# Patient Record
Sex: FEMALE | Race: WHITE | NOT HISPANIC OR LATINO | Employment: FULL TIME | URBAN - METROPOLITAN AREA
[De-identification: names, ages, dates, MRNs, and addresses within clinical notes are randomized per-mention and may not be internally consistent; named-entity substitution may affect disease eponyms.]

---

## 2024-05-22 ENCOUNTER — LAB (OUTPATIENT)
Dept: LAB | Facility: HOSPITAL | Age: 59
End: 2024-05-22
Payer: COMMERCIAL

## 2024-05-22 ENCOUNTER — TRANSCRIBE ORDERS (OUTPATIENT)
Dept: LAB | Facility: HOSPITAL | Age: 59
End: 2024-05-22
Payer: COMMERCIAL

## 2024-05-22 ENCOUNTER — TRANSCRIBE ORDERS (OUTPATIENT)
Dept: CARDIOLOGY | Facility: HOSPITAL | Age: 59
End: 2024-05-22
Payer: COMMERCIAL

## 2024-05-22 ENCOUNTER — HOSPITAL ENCOUNTER (OUTPATIENT)
Dept: CARDIOLOGY | Facility: HOSPITAL | Age: 59
Discharge: HOME | End: 2024-05-22
Payer: COMMERCIAL

## 2024-05-22 ENCOUNTER — HOSPITAL ENCOUNTER (OUTPATIENT)
Dept: GENERAL RADIOLOGY | Facility: HOSPITAL | Age: 59
Discharge: HOME | End: 2024-05-22
Payer: COMMERCIAL

## 2024-05-22 ENCOUNTER — TRANSCRIBE ORDERS (OUTPATIENT)
Dept: LAB | Facility: HOSPITAL | Age: 59
End: 2024-05-22

## 2024-05-22 DIAGNOSIS — Z01.818 PRE-OP TESTING: ICD-10-CM

## 2024-05-22 DIAGNOSIS — M17.12 ARTHRITIS OF LEFT KNEE: ICD-10-CM

## 2024-05-22 DIAGNOSIS — M17.12 ARTHRITIS OF LEFT KNEE: Primary | ICD-10-CM

## 2024-05-22 DIAGNOSIS — Z01.811 PRE-OP CHEST EXAM: Primary | ICD-10-CM

## 2024-05-22 LAB
ALBUMIN SERPL-MCNC: 4.4 G/DL (ref 3.5–5.2)
ALBUMIN/GLOB SERPL: 1.8 G/DL
ALP SERPL-CCNC: 66 U/L (ref 39–117)
ALT SERPL W P-5'-P-CCNC: 15 U/L (ref 1–33)
ANION GAP SERPL CALCULATED.3IONS-SCNC: 11.2 MMOL/L (ref 5–15)
AST SERPL-CCNC: 18 U/L (ref 1–32)
BILIRUB SERPL-MCNC: 0.9 MG/DL (ref 0–1.2)
BILIRUB UR QL STRIP: NEGATIVE
BUN SERPL-MCNC: 12 MG/DL (ref 6–20)
BUN/CREAT SERPL: 13 (ref 7–25)
CALCIUM SPEC-SCNC: 9.3 MG/DL (ref 8.6–10.5)
CHLORIDE SERPL-SCNC: 105 MMOL/L (ref 98–107)
CLARITY UR: CLEAR
CO2 SERPL-SCNC: 26.8 MMOL/L (ref 22–29)
COLOR UR: YELLOW
CREAT SERPL-MCNC: 0.92 MG/DL (ref 0.57–1)
DEPRECATED RDW RBC AUTO: 43.7 FL (ref 37–54)
EGFRCR SERPLBLD CKD-EPI 2021: 73.2 ML/MIN/1.73
ERYTHROCYTE [DISTWIDTH] IN BLOOD BY AUTOMATED COUNT: 13.1 % (ref 12.3–15.4)
GLOBULIN UR ELPH-MCNC: 2.5 GM/DL
GLUCOSE SERPL-MCNC: 69 MG/DL (ref 65–99)
GLUCOSE UR STRIP-MCNC: NEGATIVE MG/DL
HCT VFR BLD AUTO: 38.7 % (ref 34–46.6)
HGB BLD-MCNC: 12.6 G/DL (ref 12–15.9)
HGB UR QL STRIP.AUTO: NEGATIVE
KETONES UR QL STRIP: NEGATIVE
LEUKOCYTE ESTERASE UR QL STRIP.AUTO: NEGATIVE
MCH RBC QN AUTO: 29.9 PG (ref 26.6–33)
MCHC RBC AUTO-ENTMCNC: 32.6 G/DL (ref 31.5–35.7)
MCV RBC AUTO: 91.7 FL (ref 79–97)
NITRITE UR QL STRIP: NEGATIVE
PH UR STRIP.AUTO: 6.5 [PH] (ref 5–8)
PLATELET # BLD AUTO: 236 10*3/MM3 (ref 140–450)
PMV BLD AUTO: 9.5 FL (ref 6–12)
POTASSIUM SERPL-SCNC: 4 MMOL/L (ref 3.5–5.2)
PROT SERPL-MCNC: 6.9 G/DL (ref 6–8.5)
PROT UR QL STRIP: NEGATIVE
QT INTERVAL: 428 MS
QTC INTERVAL: 410 MS
RBC # BLD AUTO: 4.22 10*6/MM3 (ref 3.77–5.28)
SODIUM SERPL-SCNC: 143 MMOL/L (ref 136–145)
SP GR UR STRIP: 1.01 (ref 1–1.03)
UROBILINOGEN UR QL STRIP: NORMAL
WBC NRBC COR # BLD AUTO: 5 10*3/MM3 (ref 3.4–10.8)

## 2024-05-22 PROCEDURE — 85027 COMPLETE CBC AUTOMATED: CPT

## 2024-05-22 PROCEDURE — 71046 X-RAY EXAM CHEST 2 VIEWS: CPT

## 2024-05-22 PROCEDURE — 80053 COMPREHEN METABOLIC PANEL: CPT

## 2024-05-22 PROCEDURE — 36415 COLL VENOUS BLD VENIPUNCTURE: CPT

## 2024-05-22 PROCEDURE — 81003 URINALYSIS AUTO W/O SCOPE: CPT

## 2024-05-22 PROCEDURE — 93005 ELECTROCARDIOGRAM TRACING: CPT

## 2024-08-23 ENCOUNTER — TRANSCRIBE ORDERS (OUTPATIENT)
Dept: ADMINISTRATIVE | Facility: HOSPITAL | Age: 59
End: 2024-08-23

## 2024-08-23 ENCOUNTER — TRANSCRIBE ORDERS (OUTPATIENT)
Dept: ADMINISTRATIVE | Facility: HOSPITAL | Age: 59
End: 2024-08-23
Payer: COMMERCIAL

## 2024-08-23 ENCOUNTER — LAB (OUTPATIENT)
Dept: LAB | Facility: HOSPITAL | Age: 59
End: 2024-08-23
Payer: COMMERCIAL

## 2024-08-23 DIAGNOSIS — M25.562 LEFT KNEE PAIN, UNSPECIFIED CHRONICITY: Primary | ICD-10-CM

## 2024-08-23 DIAGNOSIS — M25.562 LEFT KNEE PAIN, UNSPECIFIED CHRONICITY: ICD-10-CM

## 2024-08-23 LAB
APPEARANCE FLD: ABNORMAL
COLOR FLD: ABNORMAL
EOSINOPHIL NFR FLD MANUAL: 2 %
LYMPHOCYTES NFR FLD MANUAL: 44 %
METHOD: ABNORMAL
MONOCYTES NFR FLD: 1 %
MONOS+MACROS NFR FLD: 3 %
NEUTROPHILS NFR FLD MANUAL: 50 %
NUC CELL # FLD: 621 /MM3
RBC # FLD AUTO: ABNORMAL /MM3

## 2024-08-23 PROCEDURE — 87205 SMEAR GRAM STAIN: CPT

## 2024-08-23 PROCEDURE — 87015 SPECIMEN INFECT AGNT CONCNTJ: CPT

## 2024-08-23 PROCEDURE — 87070 CULTURE OTHR SPECIMN AEROBIC: CPT

## 2024-08-23 PROCEDURE — 89051 BODY FLUID CELL COUNT: CPT

## 2024-08-28 LAB
BACTERIA FLD CULT: NORMAL
GRAM STN SPEC: NORMAL
GRAM STN SPEC: NORMAL

## 2025-02-04 ENCOUNTER — LAB (OUTPATIENT)
Dept: LAB | Facility: HOSPITAL | Age: 60
End: 2025-02-04
Payer: COMMERCIAL

## 2025-02-04 ENCOUNTER — TRANSCRIBE ORDERS (OUTPATIENT)
Dept: LAB | Facility: HOSPITAL | Age: 60
End: 2025-02-04
Payer: COMMERCIAL

## 2025-02-04 DIAGNOSIS — M25.562 LEFT KNEE PAIN, UNSPECIFIED CHRONICITY: Primary | ICD-10-CM

## 2025-02-04 DIAGNOSIS — M25.562 LEFT KNEE PAIN, UNSPECIFIED CHRONICITY: ICD-10-CM

## 2025-02-04 LAB
ALBUMIN SERPL-MCNC: 4.3 G/DL (ref 3.5–5.2)
ALBUMIN/GLOB SERPL: 1.5 G/DL
ALP SERPL-CCNC: 74 U/L (ref 39–117)
ALT SERPL W P-5'-P-CCNC: 16 U/L (ref 1–33)
ANION GAP SERPL CALCULATED.3IONS-SCNC: 12 MMOL/L (ref 5–15)
APPEARANCE FLD: ABNORMAL
AST SERPL-CCNC: 17 U/L (ref 1–32)
BASOPHILS # BLD AUTO: 0.03 10*3/MM3 (ref 0–0.2)
BASOPHILS NFR BLD AUTO: 0.3 % (ref 0–1.5)
BILIRUB SERPL-MCNC: 1.7 MG/DL (ref 0–1.2)
BUN SERPL-MCNC: 15 MG/DL (ref 6–20)
BUN/CREAT SERPL: 16.7 (ref 7–25)
CALCIUM SPEC-SCNC: 9.3 MG/DL (ref 8.6–10.5)
CHLORIDE SERPL-SCNC: 103 MMOL/L (ref 98–107)
CO2 SERPL-SCNC: 24 MMOL/L (ref 22–29)
COLOR FLD: ABNORMAL
CREAT SERPL-MCNC: 0.9 MG/DL (ref 0.57–1)
CRP SERPL-MCNC: 17.46 MG/DL (ref 0–0.5)
DEPRECATED RDW RBC AUTO: 38.9 FL (ref 37–54)
EGFRCR SERPLBLD CKD-EPI 2021: 74.7 ML/MIN/1.73
EOSINOPHIL # BLD AUTO: 0.1 10*3/MM3 (ref 0–0.4)
EOSINOPHIL NFR BLD AUTO: 1 % (ref 0.3–6.2)
ERYTHROCYTE [DISTWIDTH] IN BLOOD BY AUTOMATED COUNT: 12.6 % (ref 12.3–15.4)
ERYTHROCYTE [SEDIMENTATION RATE] IN BLOOD: 17 MM/HR (ref 0–30)
GLOBULIN UR ELPH-MCNC: 2.9 GM/DL
GLUCOSE SERPL-MCNC: 85 MG/DL (ref 65–99)
HCT VFR BLD AUTO: 36.6 % (ref 34–46.6)
HGB BLD-MCNC: 12 G/DL (ref 12–15.9)
HOLD SPECIMEN: NORMAL
IMM GRANULOCYTES # BLD AUTO: 0.05 10*3/MM3 (ref 0–0.05)
IMM GRANULOCYTES NFR BLD AUTO: 0.5 % (ref 0–0.5)
LYMPHOCYTES # BLD AUTO: 0.74 10*3/MM3 (ref 0.7–3.1)
LYMPHOCYTES NFR BLD AUTO: 7.2 % (ref 19.6–45.3)
LYMPHOCYTES NFR FLD MANUAL: 1 %
MCH RBC QN AUTO: 28.4 PG (ref 26.6–33)
MCHC RBC AUTO-ENTMCNC: 32.8 G/DL (ref 31.5–35.7)
MCV RBC AUTO: 86.5 FL (ref 79–97)
METHOD: ABNORMAL
MONOCYTES # BLD AUTO: 1.14 10*3/MM3 (ref 0.1–0.9)
MONOCYTES NFR BLD AUTO: 11.1 % (ref 5–12)
MONOCYTES NFR FLD: 7 %
MONOS+MACROS NFR FLD: 40 %
NEUTROPHILS NFR BLD AUTO: 79.9 % (ref 42.7–76)
NEUTROPHILS NFR BLD AUTO: 8.24 10*3/MM3 (ref 1.7–7)
NEUTROPHILS NFR FLD MANUAL: 52 %
NRBC BLD AUTO-RTO: 0 /100 WBC (ref 0–0.2)
NUC CELL # FLD: ABNORMAL /MM3
PLATELET # BLD AUTO: 189 10*3/MM3 (ref 140–450)
PMV BLD AUTO: 9.5 FL (ref 6–12)
POTASSIUM SERPL-SCNC: 5 MMOL/L (ref 3.5–5.2)
PROT SERPL-MCNC: 7.2 G/DL (ref 6–8.5)
RBC # BLD AUTO: 4.23 10*6/MM3 (ref 3.77–5.28)
RBC # FLD AUTO: ABNORMAL /MM3
SODIUM SERPL-SCNC: 139 MMOL/L (ref 136–145)
WBC NRBC COR # BLD AUTO: 10.3 10*3/MM3 (ref 3.4–10.8)

## 2025-02-04 PROCEDURE — 85025 COMPLETE CBC W/AUTO DIFF WBC: CPT

## 2025-02-04 PROCEDURE — 87015 SPECIMEN INFECT AGNT CONCNTJ: CPT | Performed by: ORTHOPAEDIC SURGERY

## 2025-02-04 PROCEDURE — 87205 SMEAR GRAM STAIN: CPT | Performed by: ORTHOPAEDIC SURGERY

## 2025-02-04 PROCEDURE — 36415 COLL VENOUS BLD VENIPUNCTURE: CPT

## 2025-02-04 PROCEDURE — 86140 C-REACTIVE PROTEIN: CPT

## 2025-02-04 PROCEDURE — 87070 CULTURE OTHR SPECIMN AEROBIC: CPT | Performed by: ORTHOPAEDIC SURGERY

## 2025-02-04 PROCEDURE — 80053 COMPREHEN METABOLIC PANEL: CPT

## 2025-02-04 PROCEDURE — 85652 RBC SED RATE AUTOMATED: CPT

## 2025-02-04 PROCEDURE — 89051 BODY FLUID CELL COUNT: CPT | Performed by: ORTHOPAEDIC SURGERY

## 2025-02-06 ENCOUNTER — HOSPITAL ENCOUNTER (OUTPATIENT)
Dept: GENERAL RADIOLOGY | Facility: HOSPITAL | Age: 60
Discharge: HOME | End: 2025-02-06
Payer: COMMERCIAL

## 2025-02-06 ENCOUNTER — HOSPITAL ENCOUNTER (INPATIENT)
Facility: HOSPITAL | Age: 60
LOS: 1 days | Discharge: HOME | DRG: 487 | End: 2025-02-11
Attending: ORTHOPAEDIC SURGERY | Admitting: ORTHOPAEDIC SURGERY
Payer: COMMERCIAL

## 2025-02-06 ENCOUNTER — PRE-ADMISSION TESTING (OUTPATIENT)
Dept: PREADMISSION TESTING | Facility: HOSPITAL | Age: 60
End: 2025-02-06
Payer: COMMERCIAL

## 2025-02-06 VITALS
SYSTOLIC BLOOD PRESSURE: 110 MMHG | TEMPERATURE: 98 F | OXYGEN SATURATION: 98 % | BODY MASS INDEX: 23.68 KG/M2 | WEIGHT: 138.7 LBS | HEART RATE: 80 BPM | RESPIRATION RATE: 18 BRPM | DIASTOLIC BLOOD PRESSURE: 71 MMHG | HEIGHT: 64 IN

## 2025-02-06 DIAGNOSIS — Z96.652 HISTORY OF KNEE REPLACEMENT PROCEDURE OF LEFT KNEE: ICD-10-CM

## 2025-02-06 PROBLEM — T84.59XA INFECTED PROSTHETIC KNEE JOINT: Status: ACTIVE | Noted: 2025-02-06

## 2025-02-06 PROBLEM — Z96.659 INFECTED PROSTHETIC KNEE JOINT: Status: ACTIVE | Noted: 2025-02-06

## 2025-02-06 LAB
ABO GROUP BLD: NORMAL
ANION GAP SERPL CALCULATED.3IONS-SCNC: 9 MMOL/L (ref 5–15)
BASOPHILS # BLD AUTO: 0.02 10*3/MM3 (ref 0–0.2)
BASOPHILS NFR BLD AUTO: 0.4 % (ref 0–1.5)
BLD GP AB SCN SERPL QL: NEGATIVE
BUN SERPL-MCNC: 10 MG/DL (ref 6–20)
BUN/CREAT SERPL: 14.3 (ref 7–25)
CALCIUM SPEC-SCNC: 9.1 MG/DL (ref 8.6–10.5)
CHLORIDE SERPL-SCNC: 104 MMOL/L (ref 98–107)
CO2 SERPL-SCNC: 27 MMOL/L (ref 22–29)
CREAT SERPL-MCNC: 0.7 MG/DL (ref 0.57–1)
DEPRECATED RDW RBC AUTO: 39 FL (ref 37–54)
EGFRCR SERPLBLD CKD-EPI 2021: 101 ML/MIN/1.73
EOSINOPHIL # BLD AUTO: 0.13 10*3/MM3 (ref 0–0.4)
EOSINOPHIL NFR BLD AUTO: 2.8 % (ref 0.3–6.2)
ERYTHROCYTE [DISTWIDTH] IN BLOOD BY AUTOMATED COUNT: 12.5 % (ref 12.3–15.4)
GLUCOSE SERPL-MCNC: 121 MG/DL (ref 65–99)
HBA1C MFR BLD: 4.8 % (ref 4.8–5.6)
HCT VFR BLD AUTO: 33.6 % (ref 34–46.6)
HGB BLD-MCNC: 11.5 G/DL (ref 12–15.9)
IMM GRANULOCYTES # BLD AUTO: 0.01 10*3/MM3 (ref 0–0.05)
IMM GRANULOCYTES NFR BLD AUTO: 0.2 % (ref 0–0.5)
INR PPP: 1.03 (ref 0.9–1.1)
LYMPHOCYTES # BLD AUTO: 0.86 10*3/MM3 (ref 0.7–3.1)
LYMPHOCYTES NFR BLD AUTO: 18.4 % (ref 19.6–45.3)
MCH RBC QN AUTO: 29.6 PG (ref 26.6–33)
MCHC RBC AUTO-ENTMCNC: 34.2 G/DL (ref 31.5–35.7)
MCV RBC AUTO: 86.6 FL (ref 79–97)
MONOCYTES # BLD AUTO: 0.55 10*3/MM3 (ref 0.1–0.9)
MONOCYTES NFR BLD AUTO: 11.8 % (ref 5–12)
NEUTROPHILS NFR BLD AUTO: 3.1 10*3/MM3 (ref 1.7–7)
NEUTROPHILS NFR BLD AUTO: 66.4 % (ref 42.7–76)
NRBC BLD AUTO-RTO: 0 /100 WBC (ref 0–0.2)
PLATELET # BLD AUTO: 238 10*3/MM3 (ref 140–450)
PMV BLD AUTO: 9.3 FL (ref 6–12)
POTASSIUM SERPL-SCNC: 3.6 MMOL/L (ref 3.5–5.2)
PROTHROMBIN TIME: 13.4 SECONDS (ref 11.7–14.2)
RBC # BLD AUTO: 3.88 10*6/MM3 (ref 3.77–5.28)
RH BLD: POSITIVE
SODIUM SERPL-SCNC: 140 MMOL/L (ref 136–145)
T&S EXPIRATION DATE: NORMAL
WBC NRBC COR # BLD AUTO: 4.67 10*3/MM3 (ref 3.4–10.8)

## 2025-02-06 PROCEDURE — 85025 COMPLETE CBC W/AUTO DIFF WBC: CPT | Performed by: ORTHOPAEDIC SURGERY

## 2025-02-06 PROCEDURE — 86850 RBC ANTIBODY SCREEN: CPT | Performed by: ORTHOPAEDIC SURGERY

## 2025-02-06 PROCEDURE — 86900 BLOOD TYPING SEROLOGIC ABO: CPT | Performed by: ORTHOPAEDIC SURGERY

## 2025-02-06 PROCEDURE — 36415 COLL VENOUS BLD VENIPUNCTURE: CPT

## 2025-02-06 PROCEDURE — 86901 BLOOD TYPING SEROLOGIC RH(D): CPT | Performed by: ORTHOPAEDIC SURGERY

## 2025-02-06 PROCEDURE — 73560 X-RAY EXAM OF KNEE 1 OR 2: CPT

## 2025-02-06 PROCEDURE — 80048 BASIC METABOLIC PNL TOTAL CA: CPT | Performed by: ORTHOPAEDIC SURGERY

## 2025-02-06 PROCEDURE — 93010 ELECTROCARDIOGRAM REPORT: CPT | Performed by: INTERNAL MEDICINE

## 2025-02-06 PROCEDURE — 71046 X-RAY EXAM CHEST 2 VIEWS: CPT

## 2025-02-06 PROCEDURE — 83036 HEMOGLOBIN GLYCOSYLATED A1C: CPT

## 2025-02-06 PROCEDURE — G0378 HOSPITAL OBSERVATION PER HR: HCPCS

## 2025-02-06 PROCEDURE — 85610 PROTHROMBIN TIME: CPT

## 2025-02-06 PROCEDURE — 93005 ELECTROCARDIOGRAM TRACING: CPT

## 2025-02-06 RX ORDER — BISACODYL 10 MG
10 SUPPOSITORY, RECTAL RECTAL DAILY PRN
Status: DISCONTINUED | OUTPATIENT
Start: 2025-02-06 | End: 2025-02-11 | Stop reason: HOSPADM

## 2025-02-06 RX ORDER — AMOXICILLIN 250 MG
2 CAPSULE ORAL 2 TIMES DAILY
Status: DISCONTINUED | OUTPATIENT
Start: 2025-02-06 | End: 2025-02-11 | Stop reason: HOSPADM

## 2025-02-06 RX ORDER — OXYCODONE HYDROCHLORIDE 5 MG/1
5 TABLET ORAL EVERY 4 HOURS PRN
Status: DISCONTINUED | OUTPATIENT
Start: 2025-02-06 | End: 2025-02-11 | Stop reason: HOSPADM

## 2025-02-06 RX ORDER — CHLORHEXIDINE GLUCONATE 40 MG/ML
SOLUTION TOPICAL
COMMUNITY
Start: 2024-05-16 | End: 2025-02-06

## 2025-02-06 RX ORDER — SODIUM CHLORIDE 9 MG/ML
40 INJECTION, SOLUTION INTRAVENOUS AS NEEDED
Status: DISCONTINUED | OUTPATIENT
Start: 2025-02-06 | End: 2025-02-11 | Stop reason: HOSPADM

## 2025-02-06 RX ORDER — POLYETHYLENE GLYCOL 3350 17 G/17G
17 POWDER, FOR SOLUTION ORAL DAILY PRN
Status: DISCONTINUED | OUTPATIENT
Start: 2025-02-06 | End: 2025-02-11 | Stop reason: HOSPADM

## 2025-02-06 RX ORDER — NALOXONE HCL 0.4 MG/ML
0.4 VIAL (ML) INJECTION
Status: DISCONTINUED | OUTPATIENT
Start: 2025-02-06 | End: 2025-02-11 | Stop reason: HOSPADM

## 2025-02-06 RX ORDER — SODIUM CHLORIDE 0.9 % (FLUSH) 0.9 %
1-10 SYRINGE (ML) INJECTION AS NEEDED
Status: DISCONTINUED | OUTPATIENT
Start: 2025-02-06 | End: 2025-02-11 | Stop reason: HOSPADM

## 2025-02-06 RX ORDER — BISACODYL 5 MG/1
5 TABLET, DELAYED RELEASE ORAL DAILY PRN
Status: DISCONTINUED | OUTPATIENT
Start: 2025-02-06 | End: 2025-02-11 | Stop reason: HOSPADM

## 2025-02-06 RX ORDER — SODIUM CHLORIDE 0.9 % (FLUSH) 0.9 %
10 SYRINGE (ML) INJECTION EVERY 12 HOURS SCHEDULED
Status: DISCONTINUED | OUTPATIENT
Start: 2025-02-06 | End: 2025-02-11 | Stop reason: HOSPADM

## 2025-02-06 RX ORDER — ACETAMINOPHEN 500 MG
500 TABLET ORAL EVERY 6 HOURS PRN
COMMUNITY

## 2025-02-06 RX ORDER — FAMOTIDINE 20 MG/1
40 TABLET, FILM COATED ORAL DAILY
Status: DISCONTINUED | OUTPATIENT
Start: 2025-02-06 | End: 2025-02-11 | Stop reason: HOSPADM

## 2025-02-06 RX ORDER — OXYCODONE HYDROCHLORIDE 5 MG/1
10 TABLET ORAL EVERY 4 HOURS PRN
Status: DISCONTINUED | OUTPATIENT
Start: 2025-02-06 | End: 2025-02-11 | Stop reason: HOSPADM

## 2025-02-06 RX ORDER — ROSUVASTATIN CALCIUM 5 MG/1
5 TABLET, COATED ORAL 3 TIMES WEEKLY
Status: ON HOLD | COMMUNITY
End: 2025-02-06

## 2025-02-06 RX ORDER — IBUPROFEN 800 MG/1
1 TABLET, FILM COATED ORAL 3 TIMES DAILY
COMMUNITY

## 2025-02-06 RX ORDER — TRAMADOL HYDROCHLORIDE 50 MG/1
TABLET ORAL
COMMUNITY
End: 2025-02-06

## 2025-02-06 RX ORDER — ONDANSETRON 4 MG/1
4 TABLET, ORALLY DISINTEGRATING ORAL EVERY 6 HOURS PRN
Status: DISCONTINUED | OUTPATIENT
Start: 2025-02-06 | End: 2025-02-11 | Stop reason: HOSPADM

## 2025-02-06 RX ORDER — OXYCODONE AND ACETAMINOPHEN 5; 325 MG/1; MG/1
TABLET ORAL
COMMUNITY
End: 2025-02-06

## 2025-02-06 RX ADMIN — SENNOSIDES AND DOCUSATE SODIUM 2 TABLET: 50; 8.6 TABLET ORAL at 20:03

## 2025-02-06 RX ADMIN — FAMOTIDINE 40 MG: 20 TABLET, FILM COATED ORAL at 20:03

## 2025-02-06 NOTE — DISCHARGE INSTRUCTIONS
Take the following medications the morning of surgery:  NONE      If you are on prescription narcotic pain medication to control your pain you may also take that medication the morning of surgery.      General Instructions:     Do not eat solid food after midnight the night before surgery.  Clear liquids day of surgery are allowed but must be stopped at least two hours before your hospital arrival time.       Allowed clear liquids      Water, sodas, and tea or coffee with no cream or milk added.       12 to 20 ounces of a clear liquid that contains carbohydrates is recommended.  If non-diabetic, have Gatorade or Powerade.  If diabetic, have G2 or Powerade Zero.     Do not have liquids red in color.  Do not consume chicken, beef, pork or vegetable broth or bouillon cubes of any variety as they are not considered clear liquids and are not allowed.      Infants may have breast milk up to four hours before surgery.  Infants drinking formula may drink formula up to six hours before surgery.   Patients who avoid smoking, chewing tobacco and alcohol for 4 weeks prior to surgery have a reduced risk of post-operative complications.  Quit smoking as many days before surgery as you can.  Do not smoke, use chewing tobacco or drink alcohol the day of surgery.   If applicable bring your C-PAP/ BI-PAP machine in with you to preop day of surgery.  Bring any papers given to you in the doctor’s office.  Wear clean comfortable clothes.  Do not wear contact lenses, false eyelashes or make-up.  Bring a case for your glasses.   Bring crutches or walker if applicable.  Remove all piercings.  Leave jewelry and any other valuables at home.  Hair extensions with metal clips must be removed prior to surgery.  The Pre-Admission Testing nurse will instruct you to bring medications if unable to obtain an accurate list in Pre-Admission Testing.            Preventing a Surgical Site Infection:  For 2 to 3 days before surgery, avoid shaving with a  razor because the razor can irritate skin and make it easier to develop an infection.    Any areas of open skin can increase the risk of a post-operative wound infection by allowing bacteria to enter and travel throughout the body.  Notify your surgeon if you have any skin wounds / rashes even if it is not near the expected surgical site.  The area will need assessed to determine if surgery should be delayed until it is healed.  The night prior to surgery shower using a fresh bar of anti-bacterial soap (such as Dial) and clean washcloth.  Sleep in a clean bed with clean clothing.  Do not allow pets to sleep with you.  Shower on the morning of surgery using a fresh bar of anti-bacterial soap (such as Dial) and clean washcloth.  Dry with a clean towel and dress in clean clothing.  Ask your surgeon if you will be receiving antibiotics prior to surgery.  Make sure you, your family, and all healthcare providers clean their hands with soap and water or an alcohol based hand  before caring for you or your wound.    Day of surgery:  Your arrival time is approximately two hours before your scheduled surgery time.  Please note if you have an early arrival time the surgery doors do not open before 5:00 AM.  Upon arrival, a Pre-op nurse and Anesthesiologist will review your health history, obtain vital signs, and answer questions you may have.  The only belongings needed at this time will be a list of your home medications and if applicable your C-PAP/BI-PAP machine.  A Pre-op nurse will start an IV and you may receive medication in preparation for surgery, including something to help you relax.     Please be aware that surgery does come with discomfort.  We want to make every effort to control your discomfort so please discuss any uncontrolled symptoms with your nurse.   Your doctor will most likely have prescribed pain medications.      If you are going home after surgery you will receive individualized written care  instructions before being discharged.  A responsible adult must drive you to and from the hospital on the day of your surgery and ideally stay with you through the night.   .  Discharge prescriptions can be filled by the hospital pharmacy during regular pharmacy hours.  If you are having surgery late in the day/evening your prescription may be e-prescribed to your pharmacy.  Please verify your pharmacy hours or chose a 24 hour pharmacy to avoid not having access to your prescription because your pharmacy has closed for the day.    If you are staying overnight following surgery, you will be transported to your hospital room following the recovery period.  [facility] has all private rooms.    If you have any questions please call Pre-Admission Testing at [phone].  Deductibles and co-payments are collected on the day of service. Please be prepared to pay the required co-pay, deductible or deposit on the day of service as defined by your plan.    Call your surgeon immediately if you experience any of the following symptoms:  Sore Throat  Shortness of Breath or difficulty breathing  Cough  Chills  Body soreness or muscle pain  Headache  Fever  New loss of taste or smell  Do not arrive for your surgery ill.  Your procedure will need to be rescheduled to another time.  You will need to call your physician before the day of surgery to avoid any unnecessary exposure to hospital staff as well as other patients.        CHLORHEXIDINE CLOTH INSTRUCTIONS  The morning of surgery follow these instructions using the Chlorhexidine cloths you've been given.  These steps reduce bacteria on the body.  Do not use the cloths near your eyes, ears mouth, genitalia or on open wounds.  Throw the cloths away after use but do not try to flush them down a toilet.      Open and remove one cloth at a time from the package.    Leave the cloth unfolded and begin the bathing.  Massage the skin with the cloths using gentle  pressure to remove bacteria.  Do not scrub harshly.   Follow the steps below with one 2% CHG cloth per area (6 total cloths).  One cloth for neck, shoulders and chest.  One cloth for both arms, hands, fingers and underarms (do underarms last).  One cloth for the abdomen followed by groin.  One cloth for right leg and foot including between the toes.  One cloth for left leg and foot including between the toes.  The last cloth is to be used for the back of the neck, back and buttocks.    Allow the CHG to air dry 3 minutes on the skin which will give it time to work and decrease the chance of irritation.  The skin may feel sticky until it is dry.  Do not rinse with water or any other liquid or you will lose the beneficial effects of the CHG.  If mild skin irritation occurs, do rinse the skin to remove the CHG.  Report this to the nurse at time of admission.  Do not apply lotions, creams, ointments, deodorants or perfumes after using the clothes. Dress in clean clothes before coming to the hospital.

## 2025-02-07 ENCOUNTER — ANESTHESIA EVENT (OUTPATIENT)
Dept: PERIOP | Facility: HOSPITAL | Age: 60
End: 2025-02-07
Payer: COMMERCIAL

## 2025-02-07 ENCOUNTER — ANESTHESIA (OUTPATIENT)
Dept: PERIOP | Facility: HOSPITAL | Age: 60
End: 2025-02-07
Payer: COMMERCIAL

## 2025-02-07 LAB
QT INTERVAL: 400 MS
QTC INTERVAL: 407 MS

## 2025-02-07 PROCEDURE — 87205 SMEAR GRAM STAIN: CPT | Performed by: ORTHOPAEDIC SURGERY

## 2025-02-07 PROCEDURE — 25010000002 PROPOFOL 10 MG/ML EMULSION

## 2025-02-07 PROCEDURE — 25010000002 DEXAMETHASONE SODIUM PHOSPHATE 20 MG/5ML SOLUTION

## 2025-02-07 PROCEDURE — 25010000002 MAGNESIUM SULFATE PER 500 MG OF MAGNESIUM

## 2025-02-07 PROCEDURE — 25010000002 VANCOMYCIN HCL 1.25 G RECONSTITUTED SOLUTION 1 EACH VIAL: Performed by: ORTHOPAEDIC SURGERY

## 2025-02-07 PROCEDURE — 25010000002 METHOCARBAMOL 1000 MG/10ML SOLUTION

## 2025-02-07 PROCEDURE — G0378 HOSPITAL OBSERVATION PER HR: HCPCS

## 2025-02-07 PROCEDURE — 25010000002 ROPIVACAINE PER 1 MG: Performed by: ORTHOPAEDIC SURGERY

## 2025-02-07 PROCEDURE — 25010000002 SUGAMMADEX 200 MG/2ML SOLUTION

## 2025-02-07 PROCEDURE — 25010000002 PROPOFOL 500 MG/50ML EMULSION

## 2025-02-07 PROCEDURE — 25010000002 VANCOMYCIN 1 G RECONSTITUTED SOLUTION: Performed by: ORTHOPAEDIC SURGERY

## 2025-02-07 PROCEDURE — C1776 JOINT DEVICE (IMPLANTABLE): HCPCS | Performed by: ORTHOPAEDIC SURGERY

## 2025-02-07 PROCEDURE — 0SBD0ZZ EXCISION OF LEFT KNEE JOINT, OPEN APPROACH: ICD-10-PCS | Performed by: ORTHOPAEDIC SURGERY

## 2025-02-07 PROCEDURE — 25010000002 GLYCOPYRROLATE 1 MG/5ML SOLUTION

## 2025-02-07 PROCEDURE — 25010000002 CEFAZOLIN PER 500 MG: Performed by: ORTHOPAEDIC SURGERY

## 2025-02-07 PROCEDURE — 25010000002 CEFTRIAXONE PER 250 MG: Performed by: ORTHOPAEDIC SURGERY

## 2025-02-07 PROCEDURE — 25010000002 HYDROMORPHONE 1 MG/ML SOLUTION

## 2025-02-07 PROCEDURE — 25010000002 KETOROLAC TROMETHAMINE PER 15 MG: Performed by: ORTHOPAEDIC SURGERY

## 2025-02-07 PROCEDURE — 25010000002 FENTANYL CITRATE (PF) 50 MCG/ML SOLUTION

## 2025-02-07 PROCEDURE — 25010000002 DIPHENHYDRAMINE PER 50 MG

## 2025-02-07 PROCEDURE — 0SPD09Z REMOVAL OF LINER FROM LEFT KNEE JOINT, OPEN APPROACH: ICD-10-PCS | Performed by: ORTHOPAEDIC SURGERY

## 2025-02-07 PROCEDURE — 25010000002 CLONIDINE PER 1 MG: Performed by: ORTHOPAEDIC SURGERY

## 2025-02-07 PROCEDURE — 99204 OFFICE O/P NEW MOD 45 MIN: CPT | Performed by: STUDENT IN AN ORGANIZED HEALTH CARE EDUCATION/TRAINING PROGRAM

## 2025-02-07 PROCEDURE — 25010000002 ONDANSETRON PER 1 MG

## 2025-02-07 PROCEDURE — 25010000002 EPINEPHRINE 1 MG/ML SOLUTION 30 ML VIAL: Performed by: ORTHOPAEDIC SURGERY

## 2025-02-07 PROCEDURE — 97161 PT EVAL LOW COMPLEX 20 MIN: CPT

## 2025-02-07 PROCEDURE — 0SUW09Z SUPPLEMENT LEFT KNEE JOINT, TIBIAL SURFACE WITH LINER, OPEN APPROACH: ICD-10-PCS | Performed by: ORTHOPAEDIC SURGERY

## 2025-02-07 PROCEDURE — 25810000003 SODIUM CHLORIDE 0.9 % SOLUTION 250 ML FLEX CONT: Performed by: ORTHOPAEDIC SURGERY

## 2025-02-07 PROCEDURE — 87070 CULTURE OTHR SPECIMN AEROBIC: CPT | Performed by: ORTHOPAEDIC SURGERY

## 2025-02-07 PROCEDURE — 25010000002 LIDOCAINE 2% SOLUTION

## 2025-02-07 PROCEDURE — 25010000002 HYDROMORPHONE PER 4 MG

## 2025-02-07 PROCEDURE — 25810000003 LACTATED RINGERS PER 1000 ML: Performed by: STUDENT IN AN ORGANIZED HEALTH CARE EDUCATION/TRAINING PROGRAM

## 2025-02-07 PROCEDURE — 97110 THERAPEUTIC EXERCISES: CPT

## 2025-02-07 DEVICE — JOURNEY II BCS XLPE ARTICULAR                                    INSERT SZ 1-2 LEFT 11MM
Type: IMPLANTABLE DEVICE | Site: KNEE | Status: FUNCTIONAL
Brand: JOURNEY

## 2025-02-07 RX ORDER — SODIUM CHLORIDE 0.9 % (FLUSH) 0.9 %
10 SYRINGE (ML) INJECTION EVERY 12 HOURS SCHEDULED
Status: DISCONTINUED | OUTPATIENT
Start: 2025-02-07 | End: 2025-02-07 | Stop reason: HOSPADM

## 2025-02-07 RX ORDER — PROMETHAZINE HYDROCHLORIDE 25 MG/1
25 TABLET ORAL ONCE AS NEEDED
Status: DISCONTINUED | OUTPATIENT
Start: 2025-02-07 | End: 2025-02-07 | Stop reason: HOSPADM

## 2025-02-07 RX ORDER — LABETALOL HYDROCHLORIDE 5 MG/ML
5 INJECTION, SOLUTION INTRAVENOUS
Status: DISCONTINUED | OUTPATIENT
Start: 2025-02-07 | End: 2025-02-07 | Stop reason: HOSPADM

## 2025-02-07 RX ORDER — PROPOFOL 10 MG/ML
INJECTION, EMULSION INTRAVENOUS AS NEEDED
Status: DISCONTINUED | OUTPATIENT
Start: 2025-02-07 | End: 2025-02-07 | Stop reason: SURG

## 2025-02-07 RX ORDER — LIDOCAINE HYDROCHLORIDE 20 MG/ML
INJECTION, SOLUTION INFILTRATION; PERINEURAL AS NEEDED
Status: DISCONTINUED | OUTPATIENT
Start: 2025-02-07 | End: 2025-02-07 | Stop reason: SURG

## 2025-02-07 RX ORDER — FENTANYL CITRATE 50 UG/ML
50 INJECTION, SOLUTION INTRAMUSCULAR; INTRAVENOUS
Status: DISCONTINUED | OUTPATIENT
Start: 2025-02-07 | End: 2025-02-07 | Stop reason: HOSPADM

## 2025-02-07 RX ORDER — DIPHENHYDRAMINE HYDROCHLORIDE 50 MG/ML
12.5 INJECTION INTRAMUSCULAR; INTRAVENOUS
Status: DISCONTINUED | OUTPATIENT
Start: 2025-02-07 | End: 2025-02-07 | Stop reason: HOSPADM

## 2025-02-07 RX ORDER — DIPHENHYDRAMINE HYDROCHLORIDE 50 MG/ML
INJECTION INTRAMUSCULAR; INTRAVENOUS AS NEEDED
Status: DISCONTINUED | OUTPATIENT
Start: 2025-02-07 | End: 2025-02-07 | Stop reason: SURG

## 2025-02-07 RX ORDER — MIDAZOLAM HYDROCHLORIDE 1 MG/ML
1 INJECTION, SOLUTION INTRAMUSCULAR; INTRAVENOUS
Status: DISCONTINUED | OUTPATIENT
Start: 2025-02-07 | End: 2025-02-07 | Stop reason: HOSPADM

## 2025-02-07 RX ORDER — IPRATROPIUM BROMIDE AND ALBUTEROL SULFATE 2.5; .5 MG/3ML; MG/3ML
3 SOLUTION RESPIRATORY (INHALATION) ONCE AS NEEDED
Status: DISCONTINUED | OUTPATIENT
Start: 2025-02-07 | End: 2025-02-07 | Stop reason: HOSPADM

## 2025-02-07 RX ORDER — METHOCARBAMOL 100 MG/ML
1000 INJECTION, SOLUTION INTRAMUSCULAR; INTRAVENOUS ONCE
Status: COMPLETED | OUTPATIENT
Start: 2025-02-07 | End: 2025-02-07

## 2025-02-07 RX ORDER — SODIUM CHLORIDE, SODIUM LACTATE, POTASSIUM CHLORIDE, CALCIUM CHLORIDE 600; 310; 30; 20 MG/100ML; MG/100ML; MG/100ML; MG/100ML
9 INJECTION, SOLUTION INTRAVENOUS CONTINUOUS PRN
Status: DISCONTINUED | OUTPATIENT
Start: 2025-02-07 | End: 2025-02-07 | Stop reason: HOSPADM

## 2025-02-07 RX ORDER — GLYCOPYRROLATE 0.2 MG/ML
INJECTION INTRAMUSCULAR; INTRAVENOUS AS NEEDED
Status: DISCONTINUED | OUTPATIENT
Start: 2025-02-07 | End: 2025-02-07 | Stop reason: SURG

## 2025-02-07 RX ORDER — FLUMAZENIL 0.1 MG/ML
0.2 INJECTION INTRAVENOUS AS NEEDED
Status: DISCONTINUED | OUTPATIENT
Start: 2025-02-07 | End: 2025-02-07 | Stop reason: HOSPADM

## 2025-02-07 RX ORDER — DEXAMETHASONE SODIUM PHOSPHATE 4 MG/ML
INJECTION, SOLUTION INTRA-ARTICULAR; INTRALESIONAL; INTRAMUSCULAR; INTRAVENOUS; SOFT TISSUE AS NEEDED
Status: DISCONTINUED | OUTPATIENT
Start: 2025-02-07 | End: 2025-02-07 | Stop reason: SURG

## 2025-02-07 RX ORDER — ATROPINE SULFATE 0.4 MG/ML
0.4 INJECTION, SOLUTION INTRAMUSCULAR; INTRAVENOUS; SUBCUTANEOUS ONCE AS NEEDED
Status: DISCONTINUED | OUTPATIENT
Start: 2025-02-07 | End: 2025-02-07 | Stop reason: HOSPADM

## 2025-02-07 RX ORDER — ONDANSETRON 2 MG/ML
INJECTION INTRAMUSCULAR; INTRAVENOUS AS NEEDED
Status: DISCONTINUED | OUTPATIENT
Start: 2025-02-07 | End: 2025-02-07 | Stop reason: SURG

## 2025-02-07 RX ORDER — TRANEXAMIC ACID 100 MG/ML
INJECTION, SOLUTION INTRAVENOUS AS NEEDED
Status: DISCONTINUED | OUTPATIENT
Start: 2025-02-07 | End: 2025-02-07 | Stop reason: SURG

## 2025-02-07 RX ORDER — EPHEDRINE SULFATE 50 MG/ML
5 INJECTION, SOLUTION INTRAVENOUS ONCE AS NEEDED
Status: DISCONTINUED | OUTPATIENT
Start: 2025-02-07 | End: 2025-02-07 | Stop reason: HOSPADM

## 2025-02-07 RX ORDER — NALOXONE HCL 0.4 MG/ML
0.2 VIAL (ML) INJECTION AS NEEDED
Status: DISCONTINUED | OUTPATIENT
Start: 2025-02-07 | End: 2025-02-07 | Stop reason: HOSPADM

## 2025-02-07 RX ORDER — SODIUM CHLORIDE 9 MG/ML
40 INJECTION, SOLUTION INTRAVENOUS AS NEEDED
Status: DISCONTINUED | OUTPATIENT
Start: 2025-02-07 | End: 2025-02-07 | Stop reason: HOSPADM

## 2025-02-07 RX ORDER — VANCOMYCIN HYDROCHLORIDE 1 G/20ML
INJECTION, POWDER, LYOPHILIZED, FOR SOLUTION INTRAVENOUS AS NEEDED
Status: DISCONTINUED | OUTPATIENT
Start: 2025-02-07 | End: 2025-02-07 | Stop reason: HOSPADM

## 2025-02-07 RX ORDER — KETAMINE HCL IN NACL, ISO-OSM 100MG/10ML
SYRINGE (ML) INJECTION AS NEEDED
Status: DISCONTINUED | OUTPATIENT
Start: 2025-02-07 | End: 2025-02-07 | Stop reason: SURG

## 2025-02-07 RX ORDER — PROMETHAZINE HYDROCHLORIDE 25 MG/1
25 SUPPOSITORY RECTAL ONCE AS NEEDED
Status: DISCONTINUED | OUTPATIENT
Start: 2025-02-07 | End: 2025-02-07 | Stop reason: HOSPADM

## 2025-02-07 RX ORDER — HYDRALAZINE HYDROCHLORIDE 20 MG/ML
5 INJECTION INTRAMUSCULAR; INTRAVENOUS
Status: DISCONTINUED | OUTPATIENT
Start: 2025-02-07 | End: 2025-02-07 | Stop reason: HOSPADM

## 2025-02-07 RX ORDER — ROCURONIUM BROMIDE 10 MG/ML
INJECTION, SOLUTION INTRAVENOUS AS NEEDED
Status: DISCONTINUED | OUTPATIENT
Start: 2025-02-07 | End: 2025-02-07 | Stop reason: SURG

## 2025-02-07 RX ORDER — MAGNESIUM SULFATE HEPTAHYDRATE 500 MG/ML
INJECTION, SOLUTION INTRAMUSCULAR; INTRAVENOUS AS NEEDED
Status: DISCONTINUED | OUTPATIENT
Start: 2025-02-07 | End: 2025-02-07 | Stop reason: SURG

## 2025-02-07 RX ORDER — ACETAMINOPHEN 160 MG
TABLET,DISINTEGRATING ORAL AS NEEDED
Status: DISCONTINUED | OUTPATIENT
Start: 2025-02-07 | End: 2025-02-07 | Stop reason: HOSPADM

## 2025-02-07 RX ORDER — OXYCODONE AND ACETAMINOPHEN 7.5; 325 MG/1; MG/1
1 TABLET ORAL EVERY 4 HOURS PRN
Status: DISCONTINUED | OUTPATIENT
Start: 2025-02-07 | End: 2025-02-07 | Stop reason: HOSPADM

## 2025-02-07 RX ORDER — FENTANYL CITRATE 50 UG/ML
INJECTION, SOLUTION INTRAMUSCULAR; INTRAVENOUS AS NEEDED
Status: DISCONTINUED | OUTPATIENT
Start: 2025-02-07 | End: 2025-02-07 | Stop reason: SURG

## 2025-02-07 RX ORDER — HYDROMORPHONE HYDROCHLORIDE 1 MG/ML
0.5 INJECTION, SOLUTION INTRAMUSCULAR; INTRAVENOUS; SUBCUTANEOUS
Status: DISCONTINUED | OUTPATIENT
Start: 2025-02-07 | End: 2025-02-07 | Stop reason: HOSPADM

## 2025-02-07 RX ORDER — ONDANSETRON 2 MG/ML
4 INJECTION INTRAMUSCULAR; INTRAVENOUS ONCE AS NEEDED
Status: DISCONTINUED | OUTPATIENT
Start: 2025-02-07 | End: 2025-02-07 | Stop reason: HOSPADM

## 2025-02-07 RX ORDER — HYDROCODONE BITARTRATE AND ACETAMINOPHEN 5; 325 MG/1; MG/1
1 TABLET ORAL ONCE AS NEEDED
Status: DISCONTINUED | OUTPATIENT
Start: 2025-02-07 | End: 2025-02-07 | Stop reason: HOSPADM

## 2025-02-07 RX ORDER — FENTANYL CITRATE 50 UG/ML
25 INJECTION, SOLUTION INTRAMUSCULAR; INTRAVENOUS
Status: DISCONTINUED | OUTPATIENT
Start: 2025-02-07 | End: 2025-02-07 | Stop reason: HOSPADM

## 2025-02-07 RX ORDER — SODIUM CHLORIDE 0.9 % (FLUSH) 0.9 %
10 SYRINGE (ML) INJECTION AS NEEDED
Status: DISCONTINUED | OUTPATIENT
Start: 2025-02-07 | End: 2025-02-07 | Stop reason: HOSPADM

## 2025-02-07 RX ADMIN — Medication 40 MG: at 07:13

## 2025-02-07 RX ADMIN — GLYCOPYRROLATE 0.1 MG: 0.2 INJECTION INTRAMUSCULAR; INTRAVENOUS at 07:26

## 2025-02-07 RX ADMIN — FENTANYL CITRATE 100 MCG: 50 INJECTION, SOLUTION INTRAMUSCULAR; INTRAVENOUS at 07:06

## 2025-02-07 RX ADMIN — HYDROMORPHONE HYDROCHLORIDE 0.5 MG: 1 INJECTION, SOLUTION INTRAMUSCULAR; INTRAVENOUS; SUBCUTANEOUS at 08:12

## 2025-02-07 RX ADMIN — OXYCODONE HYDROCHLORIDE 5 MG: 5 TABLET ORAL at 18:13

## 2025-02-07 RX ADMIN — TRANEXAMIC ACID 1000 MG: 100 INJECTION, SOLUTION INTRAVENOUS at 07:11

## 2025-02-07 RX ADMIN — METHOCARBAMOL 1000 MG: 1000 INJECTION, SOLUTION INTRAMUSCULAR; INTRAVENOUS at 07:40

## 2025-02-07 RX ADMIN — ROCURONIUM BROMIDE 50 MG: 10 INJECTION, SOLUTION INTRAVENOUS at 07:07

## 2025-02-07 RX ADMIN — OXYCODONE HYDROCHLORIDE 10 MG: 5 TABLET ORAL at 08:38

## 2025-02-07 RX ADMIN — HYDROMORPHONE HYDROCHLORIDE 0.5 MG: 1 INJECTION, SOLUTION INTRAMUSCULAR; INTRAVENOUS; SUBCUTANEOUS at 08:59

## 2025-02-07 RX ADMIN — CEFTRIAXONE 2000 MG: 2 INJECTION, POWDER, FOR SOLUTION INTRAMUSCULAR; INTRAVENOUS at 15:28

## 2025-02-07 RX ADMIN — HYDROMORPHONE HYDROCHLORIDE 0.5 MG: 1 INJECTION, SOLUTION INTRAMUSCULAR; INTRAVENOUS; SUBCUTANEOUS at 08:37

## 2025-02-07 RX ADMIN — PROPOFOL 50 MG: 10 INJECTION, EMULSION INTRAVENOUS at 07:27

## 2025-02-07 RX ADMIN — SUGAMMADEX 200 MG: 100 INJECTION, SOLUTION INTRAVENOUS at 07:50

## 2025-02-07 RX ADMIN — FENTANYL CITRATE 50 MCG: 50 INJECTION, SOLUTION INTRAMUSCULAR; INTRAVENOUS at 08:11

## 2025-02-07 RX ADMIN — VANCOMYCIN HYDROCHLORIDE 1250 MG: 1.25 INJECTION, POWDER, LYOPHILIZED, FOR SOLUTION INTRAVENOUS at 12:07

## 2025-02-07 RX ADMIN — GLYCOPYRROLATE 0.1 MG: 0.2 INJECTION INTRAMUSCULAR; INTRAVENOUS at 07:13

## 2025-02-07 RX ADMIN — ONDANSETRON 4 MG: 2 INJECTION INTRAMUSCULAR; INTRAVENOUS at 07:40

## 2025-02-07 RX ADMIN — OXYCODONE HYDROCHLORIDE 5 MG: 5 TABLET ORAL at 22:14

## 2025-02-07 RX ADMIN — TRANEXAMIC ACID 1000 MG: 100 INJECTION, SOLUTION INTRAVENOUS at 07:44

## 2025-02-07 RX ADMIN — Medication 10 ML: at 21:56

## 2025-02-07 RX ADMIN — DEXAMETHASONE SODIUM PHOSPHATE 10 MG: 4 INJECTION, SOLUTION INTRAMUSCULAR; INTRAVENOUS at 07:13

## 2025-02-07 RX ADMIN — FENTANYL CITRATE 50 MCG: 50 INJECTION, SOLUTION INTRAMUSCULAR; INTRAVENOUS at 08:59

## 2025-02-07 RX ADMIN — PROPOFOL 200 MG: 10 INJECTION, EMULSION INTRAVENOUS at 07:06

## 2025-02-07 RX ADMIN — SODIUM CHLORIDE 2000 MG: 900 INJECTION INTRAVENOUS at 06:59

## 2025-02-07 RX ADMIN — Medication 10 MG: at 07:27

## 2025-02-07 RX ADMIN — HYDROMORPHONE HYDROCHLORIDE 0.5 MG: 1 INJECTION, SOLUTION INTRAMUSCULAR; INTRAVENOUS; SUBCUTANEOUS at 08:00

## 2025-02-07 RX ADMIN — SODIUM CHLORIDE, POTASSIUM CHLORIDE, SODIUM LACTATE AND CALCIUM CHLORIDE: 600; 310; 30; 20 INJECTION, SOLUTION INTRAVENOUS at 06:59

## 2025-02-07 RX ADMIN — LIDOCAINE HYDROCHLORIDE 100 MG: 20 INJECTION, SOLUTION INFILTRATION; PERINEURAL at 07:06

## 2025-02-07 RX ADMIN — PROPOFOL 120 MCG/KG/MIN: 10 INJECTION, EMULSION INTRAVENOUS at 07:10

## 2025-02-07 RX ADMIN — DIPHENHYDRAMINE HYDROCHLORIDE 12.5 MG: 50 INJECTION, SOLUTION INTRAMUSCULAR; INTRAVENOUS at 07:11

## 2025-02-07 RX ADMIN — HYDROMORPHONE HYDROCHLORIDE 0.5 MG: 1 INJECTION, SOLUTION INTRAMUSCULAR; INTRAVENOUS; SUBCUTANEOUS at 09:38

## 2025-02-07 RX ADMIN — FENTANYL CITRATE 50 MCG: 50 INJECTION, SOLUTION INTRAMUSCULAR; INTRAVENOUS at 08:37

## 2025-02-07 RX ADMIN — MAGNESIUM SULFATE HEPTAHYDRATE 2 G: 500 INJECTION, SOLUTION INTRAMUSCULAR; INTRAVENOUS at 07:20

## 2025-02-07 NOTE — H&P
Orthopaedic Surgery  History & Physical  Dr. JONATHAN Armijo Kelsy II  [phone]    HPI:  Patient is a 59 y.o. Not  or  female who presents with complaints of left knee pain that began early last week. She presented to my office on Tuesday when the pain got significantly worse. She has a history of a left total knee being performed by me roughly 8 months ago. It was functioning quite well until this pain came out of nowhere. she did have some initial problems with the knee swelling, although we aspirated the knee quite a while ago, which was definitive for the knee, not being infected, and it got better given some time. I decided to aspirate the knee on Tuesday and when the fluid came back, the cell count and differential was quite high and highly concerning for infection. I discussed with the patient treatment options and she elected to undergo surgical intervention. We got her on the schedule for this morning. Unfortunately, her insurance had yet to approve the surgery. I was quite worried that if we delayed her surgery further, it would significantly increase her risk of becoming septic and or losing the limb/life.Due to concerns for patient, safety and outcome, I had her admitted last night for a medical work up for plan surgery this morning.    MEDICAL HISTORY  Past Medical History:   Diagnosis Date   • Arthritis    • Infection of orthopedic implant     LEFT   • Limited mobility      Past Surgical History:   Procedure Laterality Date   • BREAST BIOPSY     • COLONOSCOPY     • DENTAL PROCEDURE      BRIDGE & IMPLANT   • DILATATION AND CURETTAGE     • ENDOMETRIAL ABLATION     • KNEE ARTHROPLASTY Left 06/05/2024    [place]     Prior to Admission medications    Medication Sig Start Date End Date Taking? Authorizing Provider   acetaminophen (TYLENOL) 500 MG tablet Take 1 tablet by mouth Every 6 (Six) Hours As Needed for Mild Pain.    Provider, MD Maddison   ibuprofen (ADVIL,MOTRIN) 800 MG tablet  "Take 1 tablet by mouth 3 (Three) Times a Day.    Provider, Historical, MD     Allergies   Allergen Reactions   • Adhesive Tape Other (See Comments)     BURN SCARS   • Penicillins Other (See Comments)     Just sensitive to it not allergic gives her a yeast infection    Penicillin     Most Recent Immunizations   Administered Date(s) Administered   • COVID-19 (PFIZER) Purple Cap Monovalent 12/04/2021   • Tdap 08/29/2016     Social History   History  Tobacco Use  •  Smoking status:  Never  •  Smokeless tobacco:  Never  Substance Use Topics  •  Alcohol use:  Not Currently       Social History   History  Substance and Sexual Activity  Drug Use  Never        REVIEW OF SYSTEMS:  Head: negative for headache  Respiratory: negative for shortness of breath.   Cardiovascular: negative for chest pain.   Gastrointestinal: negative abdominal pain.   Neurological: negative for LOC  Psychiatric/Behavioral: negative for memory loss.   All other systems reviewed and are negative    VITALS: /69 (BP Location: Right arm, Patient Position: Lying)   Pulse 69   Temp 98.8 °F (37.1 °C)   Resp 16   Ht 161.3 cm (63.5\")   Wt 63 kg (138 lb 14.2 oz)   SpO2 97%   BMI 24.22 kg/m²  Body mass index is 24.22 kg/m².    PHYSICAL EXAM:   CONSTITUTIONAL: A&Ox3, No acute distress  LUNGS: Equal chest rise, no shortness of air  CARDIOVASCULAR: palpable peripheral pulses  SKIN: no skin lesions in the area examined  LYMPH: no lymphadenopathy in the area examined  Left knee with 2+ effusion.  Redness and warmth noted.  Normal neurovascular exam to the extremity    RADIOLOGY REVIEW:   XR Chest PA & Lateral    Result Date: 2/6/2025  1. Subtle nodule projecting over the lower thoracic spine on the lateral view with small nodule projecting over the heart in the left base. These could represent composite densities though at least one true nodule is not excluded. Recommend short-term follow-up PA and lateral views of the chest or CT of the chest without " contrast.  LEFT KNEE 2 VIEWS  Distal femoral and proximal tibia components of left knee prosthesis are seen in good position. There is minimal lucency surrounding the distal femoral component which could indicate some loosening or infection. No fractures or other acute bony abnormalities are seen. There may be a small suprapatellar effusion.  IMPRESSION: 1. Postoperative changes of the left knee as described. Please see full discussion above. There may be a suprapatellar effusion.  Comment reviewed report].       XR Knee 1 or 2 View Left    Result Date: 2/6/2025  1. Subtle nodule projecting over the lower thoracic spine on the lateral view with small nodule projecting over the heart in the left base. These could represent composite densities though at least one true nodule is not excluded. Recommend short-term follow-up PA and lateral views of the chest or CT of the chest without contrast.  LEFT KNEE 2 VIEWS  Distal femoral and proximal tibia components of left knee prosthesis are seen in good position. There is minimal lucency surrounding the distal femoral component which could indicate some loosening or infection. No fractures or other acute bony abnormalities are seen. There may be a small suprapatellar effusion.  IMPRESSION: 1. Postoperative changes of the left knee as described. Please see full discussion above. There may be a suprapatellar effusion.  Comment reviewed report].         LABS:   Results for the past 24 hours:   Recent Results (from the past 24 hours)   Hemoglobin A1c    Collection Time: 02/06/25  8:53 AM    Specimen: Blood   Result Value Ref Range    Hemoglobin A1C 4.80 4.80 - 5.60 %   Protime-INR    Collection Time: 02/06/25  8:53 AM    Specimen: Blood   Result Value Ref Range    Protime 13.4 11.7 - 14.2 Seconds    INR 1.03 0.90 - 1.10   ECG 12 Lead    Collection Time: 02/06/25  9:10 AM   Result Value Ref Range    QT Interval 400 ms    QTC Interval 407 ms   Type & Screen    Collection Time:  02/06/25  8:09 PM    Specimen: Blood   Result Value Ref Range    ABO Type AB     RH type Positive     Antibody Screen Negative     T&S Expiration Date 2/9/2025 11:59:59 PM    Basic Metabolic Panel    Collection Time: 02/06/25  8:09 PM    Specimen: Blood   Result Value Ref Range    Glucose 121 (H) 65 - 99 mg/dL    BUN 10 6 - 20 mg/dL    Creatinine 0.70 0.57 - 1.00 mg/dL    Sodium 140 136 - 145 mmol/L    Potassium 3.6 3.5 - 5.2 mmol/L    Chloride 104 98 - 107 mmol/L    CO2 27.0 22.0 - 29.0 mmol/L    Calcium 9.1 8.6 - 10.5 mg/dL    BUN/Creatinine Ratio 14.3 7.0 - 25.0    Anion Gap 9.0 5.0 - 15.0 mmol/L    eGFR 101.0 >60.0 mL/min/1.73   CBC Auto Differential    Collection Time: 02/06/25  8:09 PM    Specimen: Blood   Result Value Ref Range    WBC 4.67 3.40 - 10.80 10*3/mm3    RBC 3.88 3.77 - 5.28 10*6/mm3    Hemoglobin 11.5 (L) 12.0 - 15.9 g/dL    Hematocrit 33.6 (L) 34.0 - 46.6 %    MCV 86.6 79.0 - 97.0 fL    MCH 29.6 26.6 - 33.0 pg    MCHC 34.2 31.5 - 35.7 g/dL    RDW 12.5 12.3 - 15.4 %    RDW-SD 39.0 37.0 - 54.0 fl    MPV 9.3 6.0 - 12.0 fL    Platelets 238 140 - 450 10*3/mm3    Neutrophil % 66.4 42.7 - 76.0 %    Lymphocyte % 18.4 (L) 19.6 - 45.3 %    Monocyte % 11.8 5.0 - 12.0 %    Eosinophil % 2.8 0.3 - 6.2 %    Basophil % 0.4 0.0 - 1.5 %    Immature Grans % 0.2 0.0 - 0.5 %    Neutrophils, Absolute 3.10 1.70 - 7.00 10*3/mm3    Lymphocytes, Absolute 0.86 0.70 - 3.10 10*3/mm3    Monocytes, Absolute 0.55 0.10 - 0.90 10*3/mm3    Eosinophils, Absolute 0.13 0.00 - 0.40 10*3/mm3    Basophils, Absolute 0.02 0.00 - 0.20 10*3/mm3    Immature Grans, Absolute 0.01 0.00 - 0.05 10*3/mm3    nRBC 0.0 0.0 - 0.2 /100 WBC       IMPRESSION:  Patient is a 59 y.o. Not  or  female with Left knee acute periprosthetic infection 8 months out from index knee replacement surgery    PLAN:   Admited to: Jorden Tierney II, MD  Plan: Left knee irrigation and debridement with polyethylene exchange.  Risk benefits and  "alternatives thoroughly discussed with the patient.  I did discuss with her that if this surgery is unsuccessful it may require a staged procedure with an antibiotic spacer.  She is aware.  Antibiotics will be held until intraoperative cultures are taken.    R \"Pankaj\" Kelsy ASHFORD MD  Orthopaedic Surgery  [facility]  [phone] - [place] Office  [phone] - [place] Office    "

## 2025-02-07 NOTE — PLAN OF CARE
Goal Outcome Evaluation:  Plan of Care Reviewed With: patient              Patient is a 59 y.o. female POD1 L Total Knee Polyethylene Exchange with expected post op weakness and impaired functional mobility. Patient is ind at baseline and lives with her spouse. Bedroom is located on the second level of the home. Today, patient performed bed mobility with SBA, required CGA for transfers, and ambulated 80ft using rwx requiring CGA. Patient will benefit from skilled PT services acutely to address functional deficits as well as improve level of independence prior to discharge. Anticipate home with assist and HHPT upon DC.     Anticipated Discharge Disposition (PT): home with home health, home with assist

## 2025-02-07 NOTE — ANESTHESIA PREPROCEDURE EVALUATION
Anesthesia Evaluation     Patient summary reviewed   no history of anesthetic complications:   NPO Solid Status: > 8 hours  NPO Liquid Status: > 2 hours           Airway   Mallampati: II  TM distance: >3 FB  Neck ROM: full  Dental      Comment: Denies any chipped, cracked, or loose teeth     Pulmonary - normal exam   (-) COPD, asthma, sleep apnea, not a smoker  Cardiovascular - normal exam    (+) hyperlipidemia  (-) past MI, CAD, dysrhythmias, angina      Neuro/Psych  (-) seizures, TIA, CVA  GI/Hepatic/Renal/Endo    (-) liver disease, no renal disease, diabetes, no thyroid disorder    Musculoskeletal         ROS comment: Osteopenia  Abdominal    Substance History      OB/GYN          Other   arthritis,     ROS/Med Hx Other: Unsure what kind of tape she is allergic to. Temporary tape is fine but tape staying for days caused issues.                    Anesthesia Plan    ASA 2     general     (Complications of general anesthesia include but not limited to awareness under anesthesia, nausea, vomiting, sore throat, hoarseness, chipped or cracked teeth, MI, CVA, or serious allergic reaction. )  intravenous induction     Anesthetic plan, risks, benefits, and alternatives have been provided, discussed and informed consent has been obtained with: patient.    Plan discussed with CRNA and attending.        CODE STATUS:

## 2025-02-07 NOTE — PLAN OF CARE
Goal Outcome Evaluation:         POD0. VSS. Aox4. Assist x1 to BRP. No c/o pain this shift. Dressing c/d/I. Hemovac patent. Discharge plan pending.

## 2025-02-07 NOTE — DISCHARGE PLACEMENT REQUEST
"Angie FRANCO N (59 y.o. Female)       Date of Birth   1965    Social Security Number       Address   [address] [place] [address]    Home Phone   [phone]    MRN   [id number]       Episcopalian   None    Marital Status                               Admission Date   2/6/25    Admission Type   Urgent    Admitting Provider   Jorden Tierney II, MD    Attending Provider   Jorden Tierney II, MD    Department, Room/Bed   [address], P796/1       Discharge Date       Discharge Disposition       Discharge Destination                                 Attending Provider: Jorden Tierney II, MD    Allergies: Adhesive Tape, Penicillins    Isolation: None   Infection: None   Code Status: Not on file    Ht: 161.3 cm (63.5\")   Wt: 63 kg (138 lb 14.2 oz)    Admission Cmt: None   Principal Problem: 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,Z96.659]                   Active Insurance as of 2/6/2025       Primary Coverage       Payor Plan Insurance Group Employer/Plan Group    [facility] [id number]       Payor Plan Address Payor Plan Phone Number Payor Plan Fax Number Effective Dates    [address]   1/1/2018 - None Entered    [facility] [id number]         Subscriber Name Subscriber Birth Date Member ID       ANGIE FRANCO 1965 [id number]                     Emergency Contacts        (Rel.) Home Phone Work Phone Mobile Phone    sergio wasserman (Spouse) -- -- [phone]    loni davis (Sister) -- -- [phone]            "

## 2025-02-07 NOTE — PROGRESS NOTES
Continued Stay Note  [facility]     Patient Name: Ila FRANCO  MRN: [id number]  Today's Date: 2/7/2025    Admit Date: 2/6/2025        Discharge Plan       Row Name 02/07/25 1756       Plan    Plan Comments Spoke with patient regarding options for IV antibiotics at d/c, patient is agreeable to go with [facility] infusion. Referral was sent in [facility] and notified [facility] regarding referral. [facility] to provide antibiotics and patient agreeable to go to clinic for PICC care and labs. CCP to f/u with [facility] 2/10 to confirm plans.                   Discharge Codes    No documentation.                       Isabel Bowers RN

## 2025-02-07 NOTE — PROGRESS NOTES
"[facility] Clinical Pharmacy Services: Vancomycin Pharmacokinetic Initial Consult Note    Ila FRANCO is a 59 y.o. female who is on day 0 of pharmacy to dose vancomycin.    Indication: Bone and/or Joint Infection  Consulting Provider: Dr Kelsy ASHFORD  Planned Duration of Therapy: 5 days  Loading Dose Ordered or Given: 1250 mg on 2/7 at 1100  MRSA PCR performed: n/a  Culture/Source: 2/7 left knee in process      Target: -600 mg/L.hr   Pertinent Vanc Dosing History: none  Other Antimicrobials: ceftriaxone    Vitals/Labs  Ht: 161.3 cm (63.5\"); Wt: 63 kg (138 lb 14.2 oz)  Temp Readings from Last 1 Encounters:   02/07/25 98.5 °F (36.9 °C) (Oral)    Estimated Creatinine Clearance: 88.2 mL/min (by C-G formula based on SCr of 0.7 mg/dL).       Results from last 7 days   Lab Units 02/06/25 2009 02/04/25  1441   CREATININE mg/dL 0.70 0.90   WBC 10*3/mm3 4.67 10.30     Assessment/Plan:    Vancomycin Dose:   1250 mg IVx1 then 1000 mg every  12  hours  Predictive AUC level for the dose ordered is 551 mg/L.hr, which is within the target of 400-600 mg/L.hr - aiming for higher range for indication  Vanc Trough has been ordered for 2/9 at 1000. SCr check in AM also    Pharmacy will follow patient's kidney function and will adjust doses and obtain levels as necessary. Thank you for involving pharmacy in this patient's care. Please contact pharmacy with any questions or concerns.                           Favio Myers, [facility]  Clinical Pharmacist    "

## 2025-02-07 NOTE — CONSULTS
Referring Provider: Jorden Tierney*  Reason for Consultation:     acute pji         Subjective   History of present illness: Patient is a 59-year-old male status post left total knee replacement on 6/5/2024 who presented to outpatient orthopedics office with acute onset of pain.  Left knee was aspirated at that time with 151,000 nucleated cells that were 52% neutrophils.  CRP was elevated at 17.46 and culture was no growth.  Admitted to the hospital by Dr. Tierney and underwent irrigation and debridement with polyethylene liner exchange on 2/7/2025.  Operative cultures are pending.      Patient is currently afebrile with no leukocytosis.  Patient has received perioperative cefazolin and vancomycin.  Cultures are pending.  Patient reports she is groggy from surgery today.  Denies any acute complaints.  Tolerating antibiotics.    Past Medical History:   Diagnosis Date    Arthritis     Infection of orthopedic implant     LEFT    Limited mobility        Past Surgical History:   Procedure Laterality Date    BREAST BIOPSY      COLONOSCOPY      DENTAL PROCEDURE      BRIDGE & IMPLANT    DILATATION AND CURETTAGE      ENDOMETRIAL ABLATION      KNEE ARTHROPLASTY Left 06/05/2024    [place]       family history includes Breast cancer in her maternal aunt and mother.     reports that she has never smoked. She has never used smokeless tobacco. She reports that she does not currently use alcohol. She reports that she does not use drugs.     Allergies   Allergen Reactions    Adhesive Tape Other (See Comments)     BURN SCARS    Penicillins Other (See Comments)     Just sensitive to it not allergic gives her a yeast infection    Penicillin       Medication:  Antibiotics:  Anti-Infectives (From admission, onward)      Ordered     Dose/Rate Route Frequency Start Stop    02/07/25 104  vancomycin (VANCOCIN) 1,000 mg in sodium chloride 0.9 % 250 mL IVPB-VTB        Ordering Provider: Jorden Tierney II, MD   Placed in  "\"Followed by\" Linked Group    1,000 mg  250 mL/hr over 60 Minutes Intravenous Every 12 Hours 02/07/25 2345 02/12/25 2344    02/07/25 0820  cefTRIAXone (ROCEPHIN) 2,000 mg in sodium chloride 0.9 % 100 mL MBP        Ordering Provider: Jorden Tierney II, MD    2,000 mg  200 mL/hr over 30 Minutes Intravenous Every 24 Hours 02/07/25 1200 02/12/25 1159    02/07/25 1049  Vancomycin HCl 1,250 mg in sodium chloride 0.9 % 250 mL VTB        Ordering Provider: Jorden Tierney II, MD   Placed in \"Followed by\" Linked Group    20 mg/kg × 63 kg  200 mL/hr over 75 Minutes Intravenous Once 02/07/25 1145      02/07/25 1049  Pharmacy to dose vancomycin        Ordering Provider: Jorden Tierney II, MD     Not Applicable Continuous PRN 02/07/25 1048 02/12/25 1047    02/07/25 0820  Vancomycin HCl 1,250 mg in sodium chloride 0.9 % 250 mL VTB  Status:  Discontinued        Ordering Provider: Jorden Tierney II, MD    20 mg/kg × 63 kg  200 mL/hr over 75 Minutes Intravenous Every 12 Hours 02/07/25 0822 02/07/25 1049    02/07/25 0729  vancomycin (VANCOCIN) injection  Status:  Discontinued        Ordering Provider: Jorden Tierney II, MD      As Needed 02/07/25 0729 02/07/25 0803    02/07/25 0546  ceFAZolin 2000 mg IVPB in 100 mL NS (MBP)        Ordering Provider: Jorden Tierney II, MD    2,000 mg  over 30 Minutes Intravenous Once 02/07/25 0548 02/07/25 0659              Objective     Physical Exam:   Vital Signs   Temp:  [97.8 °F (36.6 °C)-98.8 °F (37.1 °C)] 98.5 °F (36.9 °C)  Heart Rate:  [53-78] 53  Resp:  [12-16] 16  BP: ()/(52-75) 96/63    GENERAL: Awake and alert, in no acute distress.   HEENT: Oropharynx is clear. Hearing is grossly normal.   EYES: PERRL. No conjunctival injection. No lid lag.   LUNGS: Normal work of breathing.  SKIN: Left knee with dressing in place.  PSYCHIATRIC: Appropriate mood, affect, insight, and judgment.     Results Review:   I reviewed the patient's " "new clinical results.  I reviewed the patient's new imaging results and agree with the interpretation.  I reviewed the patient's other test results and agree with the interpretation    Lab Results   Component Value Date    WBC 4.67 02/06/2025    HGB 11.5 (L) 02/06/2025    HCT 33.6 (L) 02/06/2025    MCV 86.6 02/06/2025     02/06/2025       No results found for: \"VANCOPEAK\", \"VANCOTROUGH\", \"VANCORANDOM\"    Lab Results   Component Value Date    GLUCOSE 121 (H) 02/06/2025    BUN 10 02/06/2025    CREATININE 0.70 02/06/2025    EGFRIFAFRI >60 02/09/2023    BCR 14.3 02/06/2025    CO2 27.0 02/06/2025    CALCIUM 9.1 02/06/2025    ALBUMIN 4.3 02/04/2025    LABIL2 1.6 02/09/2023    AST 17 02/04/2025    ALT 16 02/04/2025         Estimated Creatinine Clearance: 88.2 mL/min (by C-G formula based on SCr of 0.7 mg/dL).    Isolation:   No active isolations      Microbiology:  Microbiology Results (last 10 days)       Procedure Component Value - Date/Time    Body Fluid Culture - Body Fluid, Knee, Left [[id number]] Collected: 02/04/25 1442    Lab Status: Preliminary result Specimen: Body Fluid from Knee, Left Updated: 02/07/25 0920     Body Fluid Culture No growth at 3 days     Gram Stain No WBCs or organisms seen           2/4 left knee aspiration:  Component      Latest Ref Rng 2/4/2025   Color, Fluid Red    Appearance, Fluid      Clear  Cloudy !    RBC, Fluid      /mm3 100,000    Nucleated Cells, Fluid      /mm3 151,980    Method: Automated Sysmex XN Method    Neutrophils, Fluid      % 52    Lymphocytes, Fluid      % 1    Monocytes, Fluid      % 7    Mononuclear, Fluid      % 40       Legend:  ! Abnormal  Radiology:  Left knee x-ray report reviewed with postoperative changes.    Assessment     #Left prosthetic knee infection status post irrigation debridement with liner exchange on 2/7/2025  #History of left total knee replacement 6/5/2024    Agree with empiric vancomycin goal -600 and ceftriaxone 2 g daily while " awaiting operative cultures.  Follow vancomycin levels for therapeutic drug monitoring.  Will likely need 6 weeks of antibiotic therapy.      Thank you for this consult.  We will continue to follow along and tailor antibiotics as the patient's clinical course evolves.

## 2025-02-07 NOTE — THERAPY EVALUATION
Patient Name: Ila FRANCO  : 1965    MRN: [id number]                              Today's Date: 2025       Admit Date: 2025    Visit Dx:     ICD-10-CM ICD-9-CM   1. History of knee replacement procedure of left knee  Z96.652 V43.65     Patient Active Problem List   Diagnosis    Infected prosthetic knee joint     Past Medical History:   Diagnosis Date    Arthritis     Infection of orthopedic implant     LEFT    Limited mobility      Past Surgical History:   Procedure Laterality Date    BREAST BIOPSY      COLONOSCOPY      DENTAL PROCEDURE      BRIDGE & IMPLANT    DILATATION AND CURETTAGE      ENDOMETRIAL ABLATION      KNEE ARTHROPLASTY Left 2024    [place]      General Information       Row Name 25 1541          Physical Therapy Time and Intention    Document Type evaluation  -CB     Mode of Treatment individual therapy;physical therapy  -CB       Row Name 25 1541          General Information    Patient Profile Reviewed yes  -CB     Prior Level of Function independent:;gait;transfer;bed mobility  -CB     Existing Precautions/Restrictions fall  -CB     Barriers to Rehab none identified  -CB       Row Name 25 1541          Living Environment    People in Home spouse  -CB       Row Name 25 1541          Home Main Entrance    Number of Stairs, Main Entrance none  -CB       Row Name 25 1541          Stairs Within Home, Primary    Number of Stairs, Within Home, Primary other (see comments)  14  -CB     Stair Railings, Within Home, Primary railings safe and in good condition  -CB     Stairs Comment, Within Home, Primary bedroom on second level  -CB       Row Name 25 1541          Cognition    Orientation Status (Cognition) oriented x 4  -CB       Row Name 25 1541          Safety Issues/Impairments Affecting Functional Mobility    Impairments Affecting Function (Mobility) endurance/activity tolerance;strength;pain;range of motion (ROM)  -CB                User Key  (r) = Recorded By, (t) = Taken By, (c) = Cosigned By      Initials Name Provider Type    CB Tammy Bacon PT Physical Therapist                   Mobility       Row Name 02/07/25 1542          Bed Mobility    Bed Mobility supine-sit  -CB     Supine-Sit [place] (Bed Mobility) standby assist  -CB     Assistive Device (Bed Mobility) head of bed elevated  -CB       Row Name 02/07/25 1542          Sit-Stand Transfer    Sit-Stand [place] (Transfers) contact guard;verbal cues  -CB     Assistive Device (Sit-Stand Transfers) walker, front-wheeled  -CB       Row Name 02/07/25 1542          Gait/Stairs (Locomotion)    [place] Level (Gait) contact guard;verbal cues  -CB     Assistive Device (Gait) walker, front-wheeled  -CB     Distance in Feet (Gait) 80  -CB     Deviations/Abnormal Patterns (Gait) gait speed decreased;stride length decreased  -CB     Left Sided Gait Deviations weight shift ability decreased;hip hiking;heel strike decreased  decreased knee flexion  -CB     Comment, (Gait/Stairs) VC to not hip hike and to flex L knee during ambulation; VC to not lift rwx  -CB               User Key  (r) = Recorded By, (t) = Taken By, (c) = Cosigned By      Initials Name Provider Type    CB Tammy Bacon PT Physical Therapist                   Obj/Interventions       Row Name 02/07/25 1543          Range of Motion Comprehensive    Comment, General Range of Motion L knee approx 0-60 degrees knee flexion; pain during flexion  -CB       Row Name 02/07/25 1543          Strength Comprehensive (MMT)    Comment, General Manual Muscle Testing (MMT) Assessment post op weakness  -CB       Row Name 02/07/25 1543          Motor Skills    Therapeutic Exercise --  TKA protocol x10 reps  -CB       Row Name 02/07/25 1543          Balance    Balance Assessment standing static balance;standing dynamic balance;sitting static balance  -CB     Static Sitting Balance modified independence  -CB     Position, Sitting  Balance sitting edge of bed  -CB     Static Standing Balance standby assist  -CB     Dynamic Standing Balance contact guard  -CB     Position/Device Used, Standing Balance supported;walker, front-wheeled  -CB     Balance Interventions standing;sit to stand;supported;static;dynamic;minimal challenge;sitting  -CB       Row Name 02/07/25 1543          Sensory Assessment (Somatosensory)    Sensory Assessment (Somatosensory) sensation intact  -CB               User Key  (r) = Recorded By, (t) = Taken By, (c) = Cosigned By      Initials Name Provider Type    CB Tammy Bacon, PT Physical Therapist                   Goals/Plan       Row Name 02/07/25 1547          Bed Mobility Goal 1 (PT)    Activity/Assistive Device (Bed Mobility Goal 1, PT) bed mobility activities, all  -CB     [place] Level/Cues Needed (Bed Mobility Goal 1, PT) modified independence  -CB     Time Frame (Bed Mobility Goal 1, PT) long term goal (LTG);1 week  -CB       Row Name 02/07/25 1547          Transfer Goal 1 (PT)    Activity/Assistive Device (Transfer Goal 1, PT) sit-to-stand/stand-to-sit;bed-to-chair/chair-to-bed;walker, rolling  -CB     [place] Level/Cues Needed (Transfer Goal 1, PT) modified independence  -CB     Time Frame (Transfer Goal 1, PT) long term goal (LTG);1 week  -CB       Row Name 02/07/25 1547          Gait Training Goal 1 (PT)    Activity/Assistive Device (Gait Training Goal 1, PT) gait (walking locomotion);assistive device use  -CB     [place] Level (Gait Training Goal 1, PT) standby assist  -CB     Distance (Gait Training Goal 1, PT) 150  -CB     Time Frame (Gait Training Goal 1, PT) long term goal (LTG);1 week  -CB       Row Name 02/07/25 1547          Stairs Goal 1 (PT)    Activity/Assistive Device (Stairs Goal 1, PT) ascending stairs;descending stairs  -CB     Number of Stairs (Stairs Goal 1, PT) 14  -CB     Time Frame (Stairs Goal 1, PT) long term goal (LTG);1 week  -CB       Row Name 02/07/25 1543           Therapy Assessment/Plan (PT)    Planned Therapy Interventions (PT) balance training;bed mobility training;gait training;home exercise program;patient/family education;strengthening;transfer training;ROM (range of motion);stair training  -CB               User Key  (r) = Recorded By, (t) = Taken By, (c) = Cosigned By      Initials Name Provider Type    Tammy Rowe, PT Physical Therapist                   Clinical Impression       Row Name 02/07/25 1546          Pain    Pretreatment Pain Rating 8/10  -CB     Posttreatment Pain Rating 8/10  -CB     Pain Location knee  -CB     Pain Side/Orientation left  -CB     Pain Management Interventions nursing notified  -CB     Response to Pain Interventions activity participation with tolerable pain  -CB       Row Name 02/07/25 1546          Plan of Care Review    Plan of Care Reviewed With patient  -CB     Outcome Evaluation Patient is a 59 y.o. female POD1 L Total Knee Polyethylene Exchange with expected post op weakness and impaired functional mobility. Patient is ind at baseline and lives with her spouse. Bedroom is located on the second level of the home. Today, patient performed bed mobility with SBA, required CGA for transfers, and ambulated 80ft using rwx requiring CGA. Patient will benefit from skilled PT services acutely to address functional deficits as well as improve level of independence prior to discharge. Anticipate home with assist and HHPT upon DC.  -CB       Row Name 02/07/25 1546          Therapy Assessment/Plan (PT)    Rehab Potential (PT) good  -CB     Criteria for Skilled Interventions Met (PT) yes  -CB     Therapy Frequency (PT) daily  -CB               User Key  (r) = Recorded By, (t) = Taken By, (c) = Cosigned By      Initials Name Provider Type    Tammy Rowe, PT Physical Therapist                   Outcome Measures       Row Name 02/07/25 1548 02/07/25 0957       How much help from another person do you currently need...    Turning from  your back to your side while in flat bed without using bedrails? 3  -CB 3  -REJI    Moving from lying on back to sitting on the side of a flat bed without bedrails? 3  -CB 3  -REJI    Moving to and from a bed to a chair (including a wheelchair)? 3  -CB 3  -REJI    Standing up from a chair using your arms (e.g., wheelchair, bedside chair)? 3  -CB 3  -REJI    Climbing 3-5 steps with a railing? 3  -CB 3  -REJI    To walk in hospital room? 3  -CB 3  -REJI    AM-PAC 6 Clicks Score (PT) 18  -CB 18  -REJI    Highest Level of Mobility Goal 6 --> Walk 10 steps or more  -CB 6 --> Walk 10 steps or more  -REJI      Row Name 02/07/25 1548          Functional Assessment    Outcome Measure Options AM-PAC 6 Clicks Basic Mobility (PT)  -CB               User Key  (r) = Recorded By, (t) = Taken By, (c) = Cosigned By      Initials Name Provider Type    CB Tammy Bacon, PT Physical Therapist    Lizett Patel, RN Registered Nurse                                 Physical Therapy Education       Title: PT OT SLP Therapies (Done)       Topic: Physical Therapy (Done)       Point: Mobility training (Done)       Learning Progress Summary            Patient Acceptance, E,TB,D, VU,NR by  at 2/7/2025 1549                      Point: Home exercise program (Done)       Learning Progress Summary            Patient Acceptance, E,TB,D, VU,NR by  at 2/7/2025 1549                      Point: Body mechanics (Done)       Learning Progress Summary            Patient Acceptance, E,TB,D, VU,NR by  at 2/7/2025 1549                      Point: Precautions (Done)       Learning Progress Summary            Patient Acceptance, E,TB,D, VU,NR by  at 2/7/2025 1549                                      User Key       Initials Effective Dates Name Provider Type Discipline     10/22/21 -  Tammy Bacon, PT Physical Therapist PT                  PT Recommendation and Plan  Planned Therapy Interventions (PT): balance training, bed mobility training, gait training,  home exercise program, patient/family education, strengthening, transfer training, ROM (range of motion), stair training  Outcome Evaluation: Patient is a 59 y.o. female POD1 L Total Knee Polyethylene Exchange with expected post op weakness and impaired functional mobility. Patient is ind at baseline and lives with her spouse. Bedroom is located on the second level of the home. Today, patient performed bed mobility with SBA, required CGA for transfers, and ambulated 80ft using rwx requiring CGA. Patient will benefit from skilled PT services acutely to address functional deficits as well as improve level of independence prior to discharge. Anticipate home with assist and HHPT upon DC.     Time Calculation:         PT Charges       Row Name 02/07/25 1552             Time Calculation    Start Time 1522  -CB      Stop Time 1539  -CB      Time Calculation (min) 17 min  -CB      PT Received On 02/07/25  -CB      PT - Next Appointment 02/08/25  -CB      PT Goal Re-Cert Due Date 02/14/25  -CB         Time Calculation- PT    Total Timed Code Minutes- PT 8 minute(s)  -CB         Timed Charges    48578 - PT Therapeutic Exercise Minutes 8  -CB         Total Minutes    Timed Charges Total Minutes 8  -CB       Total Minutes 8  -CB                User Key  (r) = Recorded By, (t) = Taken By, (c) = Cosigned By      Initials Name Provider Type    CB Tammy Bacon, PT Physical Therapist                  Therapy Charges for Today       Code Description Service Date Service Provider Modifiers Qty    64042328721 HC PT THER PROC EA 15 MIN 2/7/2025 Tammy Bacon, PT GP 1    11641395912 HC PT EVAL LOW COMPLEXITY 3 2/7/2025 Tammy Bacon, PT GP 1            PT G-Codes  Outcome Measure Options: AM-PAC 6 Clicks Basic Mobility (PT)  AM-PAC 6 Clicks Score (PT): 18  PT Discharge Summary  Anticipated Discharge Disposition (PT): home with home health, home with assist    Tammy Bacon PT  2/7/2025

## 2025-02-07 NOTE — PLAN OF CARE
Goal Outcome Evaluation:  Plan of Care Reviewed With: patient        Progress: improving                           Pt was a direct admit from Dr Tierney office. Pt on the schedule for an I&D of the left knee. Pt has been NPO since midnight in anticipation of surgery. Pt is alert and oriented. Ambulates ad abdias. Pt has minimal complaints of pain. Pt resting at this time, will continue to monitor.

## 2025-02-07 NOTE — ANESTHESIA POSTPROCEDURE EVALUATION
Patient: Ila FRANCO    Procedure Summary       Date: 02/07/25 Room / Location: [facility] OR  / [facility] MAIN OR    Anesthesia Start: 0658 Anesthesia Stop: 0807    Procedure: LEFT TOTAL KNEE ARTHROPLASTY POLY EXCHANGE (Left: Knee) Diagnosis:     Surgeons: Jorden Tierney II, MD Provider: Kirby Saenz MD    Anesthesia Type: general ASA Status: 2            Anesthesia Type: general    Vitals  Vitals Value Taken Time   /75 02/07/25 0830   Temp 36.6 °C (97.8 °F) 02/07/25 0805   Pulse 76 02/07/25 0837   Resp 14 02/07/25 0815   SpO2 100 % 02/07/25 0837   Vitals shown include unfiled device data.        Post Anesthesia Care and Evaluation    Level of consciousness: awake and alert  Pain management: adequate    Airway patency: patent  Anesthetic complications: No anesthetic complications  PONV Status: controlled  Cardiovascular status: acceptable  Respiratory status: acceptable  Hydration status: acceptable    Comments: Deemed appropriate for discharge from post anesthetic care

## 2025-02-07 NOTE — ANESTHESIA PROCEDURE NOTES
Airway  Urgency: elective    Date/Time: 2/7/2025 7:10 AM  Airway not difficult    General Information and Staff    Patient location during procedure: OR  Anesthesiologist: Kirby Saenz MD  CRNA/CAA: Emily Grace CRNA    Indications and Patient Condition  Indications for airway management: airway protection    Preoxygenated: yes  MILS not maintained throughout  Mask difficulty assessment: 2 - vent by mask + OA or adjuvant +/- NMBA    Final Airway Details  Final airway type: endotracheal airway      Successful airway: ETT  Cuffed: yes   Successful intubation technique: direct laryngoscopy  Facilitating devices/methods: intubating stylet and cricoid pressure  Endotracheal tube insertion site: oral  Blade: Ned  Blade size: 3  ETT size (mm): 7.0  Cormack-Lehane Classification: grade I - full view of glottis  Placement verified by: chest auscultation and capnometry   Cuff volume (mL): 8  Measured from: lips  ETT/EBT  to lips (cm): 19  Number of attempts at approach: 1  Assessment: lips, teeth, and gum same as pre-op and atraumatic intubation    Additional Comments  Airway exam prior to DL, teeth/lips inspected. Preoxygenated with 100% O2; sniffing position, easy mask ventilation. Eyes taped. Atraumatic intubation. Lips and teeth intact, no damage. ETT connected to vent. Confirmed EBBS, +EtCO2.

## 2025-02-07 NOTE — OP NOTE
Total Knee Polyethylene Exchange Operative Note  Dr. JONATHAN Tierney II  [phone]    PATIENT NAME: Ila FRANCO  MRN: [id number]  : 1965 AGE: 59 y.o. GENDER: female  DATE OF OPERATION: 2025  PREOPERATIVE DIAGNOSIS:   left periprosthetic knee infection  POSTOPERATIVE DIAGNOSIS: Same  OPERATION PERFORMED: Left Total Knee Polyethylene Exchange  SURGEON: Jorden Tierney MD  Circulator: Mihaela Crowe RN; Blanche Dowell RN  Scrub Person: Narcisa Guerrero  Vendor Representative: Reyna Montelongo  ANESTHESIA: General  ASSISTANT: None   ESTIMATED BLOOD LOSS: Minimal  SPONGE AND NEEDLE COUNT: Correct  INDICATIONS:   this patient was found to have a periprosthetic knee infection that was acute in onset this week  A discussion of operative versus nonoperative treatment was had with the patient and they failed conservative management. They elected to undergo total knee arthroplasty revision. The risks of surgery were discussed and included the risk of anesthesia, infection, damage to neurovascular structures, implant loosening/failure, fracture, hardware prominence, continued pain, early failure, the need for further procedures, medical complications, and others. No guarantees were made. The patient wished to proceed with surgery and a surgical consent was signed.    COMPONENTS:    size 10 polyethylene    PERTINENT FINDINGS:  purulent material within the knee joint    DETAILS OF PROCEDURE:  The patient was met in the preoperative area. The site was marked. The consent and H&P were reviewed. The patient was then wheeled back to the operative suite and transferred to the operative table. The patient underwent anesthesia. A tourniquet was placed on the upper thigh.  A bump was placed under the operative hip. The Burger baseplate was secured to the table. Surgical alcohol was used to thoroughly clean the entire operative extremity.     The leg was then prepped in the normal sterile fashion, which included  ChloraPrep, multiple layers of sterile drapes, and surgical space suits for the entire operative team. The Burger boot was applied to the foot after adequate padding. An Ioban dressing was applied to the knee after the surgical incision was marked.  New outer gloves were used by all sterile surgical team members after final draping. After a surgical timeout  the case was begun.      Although I had discussed with the preoperative nurse that I did not want any antibiotics given prior to incision, Ancef was given despite my request.  A midline incision was utilized followed by a medial parapatellar approach.  Murky joint fluid was encountered and 3 deep cultures were taken.  I then evacuated the fluid and did a gentle lavage of the knee with some Betadine and saline.  I then removed the polyethylene and performed a complete synovectomy using the Bovie and rongeur.  Afterwards, the knee was thoroughly lavaged using multiple liters of saline and Betadine along with a mixture of hydrogen peroxide and Betadine.  I then used some Irrisept as well.  After the thorough irrigation and sharp excisional debridement, I trialed the knee.  I did end up scraping a little bit of the capsule off the posterior femur to gain some better knee flexion.  One of the patient's complaints about the knee prior to the infection was that she never regained full flexion.  After trialing with a size 10 polyethylene the knee bent back to 130 degrees and was fully straight with great varus and valgus stability.  After changing our gloves, the real polyethylene was then inserted.  A deep drain was left.  The knee was closed with interrupted and running stitches.  Staples were used in the skin.  A dry dressing was placed over the incision.    The patient was awoken from anesthesia, moved to the [facility] and taken to the recovery room in stable condition. Sponge and needle count were correct. There were no complications. Patient tolerated the procedure  "manjula.    R \"Pankaj\" Kelsy ASHFORD MD  Orthopaedic Surgery  [facility]  [phone]                "

## 2025-02-08 LAB
CREAT SERPL-MCNC: 0.65 MG/DL (ref 0.57–1)
CRP SERPL-MCNC: 4.2 MG/DL (ref 0–0.5)
EGFRCR SERPLBLD CKD-EPI 2021: 102.8 ML/MIN/1.73

## 2025-02-08 PROCEDURE — 25810000003 SODIUM CHLORIDE 0.9 % SOLUTION 250 ML FLEX CONT: Performed by: ORTHOPAEDIC SURGERY

## 2025-02-08 PROCEDURE — 25010000002 CEFTRIAXONE PER 250 MG: Performed by: ORTHOPAEDIC SURGERY

## 2025-02-08 PROCEDURE — 99215 OFFICE O/P EST HI 40 MIN: CPT | Performed by: INTERNAL MEDICINE

## 2025-02-08 PROCEDURE — G0378 HOSPITAL OBSERVATION PER HR: HCPCS

## 2025-02-08 PROCEDURE — 97530 THERAPEUTIC ACTIVITIES: CPT

## 2025-02-08 PROCEDURE — 86140 C-REACTIVE PROTEIN: CPT | Performed by: INTERNAL MEDICINE

## 2025-02-08 PROCEDURE — 82565 ASSAY OF CREATININE: CPT | Performed by: ORTHOPAEDIC SURGERY

## 2025-02-08 PROCEDURE — 97110 THERAPEUTIC EXERCISES: CPT

## 2025-02-08 PROCEDURE — 25010000002 VANCOMYCIN 1 G RECONSTITUTED SOLUTION 1 EACH VIAL: Performed by: ORTHOPAEDIC SURGERY

## 2025-02-08 RX ORDER — HYDROMORPHONE HYDROCHLORIDE 1 MG/ML
0.5 INJECTION, SOLUTION INTRAMUSCULAR; INTRAVENOUS; SUBCUTANEOUS EVERY 4 HOURS PRN
Status: DISCONTINUED | OUTPATIENT
Start: 2025-02-08 | End: 2025-02-11 | Stop reason: HOSPADM

## 2025-02-08 RX ORDER — ASPIRIN 81 MG/1
81 TABLET ORAL 2 TIMES DAILY
Status: DISCONTINUED | OUTPATIENT
Start: 2025-02-08 | End: 2025-02-11 | Stop reason: HOSPADM

## 2025-02-08 RX ORDER — ACETAMINOPHEN 325 MG/1
650 TABLET ORAL
Status: DISCONTINUED | OUTPATIENT
Start: 2025-02-08 | End: 2025-02-11 | Stop reason: HOSPADM

## 2025-02-08 RX ORDER — KETOROLAC TROMETHAMINE 30 MG/ML
30 INJECTION, SOLUTION INTRAMUSCULAR; INTRAVENOUS EVERY 6 HOURS PRN
Status: DISPENSED | OUTPATIENT
Start: 2025-02-08 | End: 2025-02-10

## 2025-02-08 RX ADMIN — OXYCODONE HYDROCHLORIDE 10 MG: 5 TABLET ORAL at 12:39

## 2025-02-08 RX ADMIN — ACETAMINOPHEN 650 MG: 325 TABLET, FILM COATED ORAL at 23:16

## 2025-02-08 RX ADMIN — ACETAMINOPHEN 650 MG: 325 TABLET, FILM COATED ORAL at 18:14

## 2025-02-08 RX ADMIN — OXYCODONE HYDROCHLORIDE 10 MG: 5 TABLET ORAL at 08:42

## 2025-02-08 RX ADMIN — CEFTRIAXONE 2000 MG: 2 INJECTION, POWDER, FOR SOLUTION INTRAMUSCULAR; INTRAVENOUS at 12:40

## 2025-02-08 RX ADMIN — VANCOMYCIN HYDROCHLORIDE 1000 MG: 1 INJECTION, POWDER, LYOPHILIZED, FOR SOLUTION INTRAVENOUS at 00:20

## 2025-02-08 RX ADMIN — OXYCODONE HYDROCHLORIDE 5 MG: 5 TABLET ORAL at 20:41

## 2025-02-08 RX ADMIN — FAMOTIDINE 40 MG: 20 TABLET, FILM COATED ORAL at 08:42

## 2025-02-08 RX ADMIN — VANCOMYCIN HYDROCHLORIDE 1000 MG: 1 INJECTION, POWDER, LYOPHILIZED, FOR SOLUTION INTRAVENOUS at 23:16

## 2025-02-08 RX ADMIN — SENNOSIDES AND DOCUSATE SODIUM 2 TABLET: 50; 8.6 TABLET ORAL at 08:42

## 2025-02-08 RX ADMIN — Medication 10 ML: at 08:43

## 2025-02-08 RX ADMIN — ASPIRIN 81 MG: 81 TABLET, DELAYED RELEASE ORAL at 20:41

## 2025-02-08 RX ADMIN — OXYCODONE HYDROCHLORIDE 5 MG: 5 TABLET ORAL at 03:19

## 2025-02-08 RX ADMIN — OXYCODONE HYDROCHLORIDE 10 MG: 5 TABLET ORAL at 16:37

## 2025-02-08 RX ADMIN — Medication 10 ML: at 20:46

## 2025-02-08 RX ADMIN — ASPIRIN 81 MG: 81 TABLET, DELAYED RELEASE ORAL at 08:41

## 2025-02-08 RX ADMIN — VANCOMYCIN HYDROCHLORIDE 1000 MG: 1 INJECTION, POWDER, LYOPHILIZED, FOR SOLUTION INTRAVENOUS at 11:15

## 2025-02-08 NOTE — PROGRESS NOTES
ID progress note     CC: Follow-up left knee PJI    Subjective: She reports expected postoperative knee pain.  She is tolerating vancomycin and ceftriaxone without any rashes or diarrhea.  Cultures are negative to date.    Medications:    Current Facility-Administered Medications:     aspirin EC tablet 81 mg, 81 mg, Oral, BID, Mamadou Lucero Jr., MD, 81 mg at 02/08/25 0841    sennosides-docusate (PERICOLACE) 8.6-50 MG per tablet 2 tablet, 2 tablet, Oral, BID, 2 tablet at 02/08/25 0842 **AND** polyethylene glycol (MIRALAX) packet 17 g, 17 g, Oral, Daily PRN **AND** bisacodyl (DULCOLAX) EC tablet 5 mg, 5 mg, Oral, Daily PRN **AND** bisacodyl (DULCOLAX) suppository 10 mg, 10 mg, Rectal, Daily PRN, Jorden Tierney II, MD    cefTRIAXone (ROCEPHIN) 2,000 mg in sodium chloride 0.9 % 100 mL MBP, 2,000 mg, Intravenous, Q24H, Jorden Tierney II, MD, Last Rate: 200 mL/hr at 02/08/25 1240, 2,000 mg at 02/08/25 1240    famotidine (PEPCID) tablet 40 mg, 40 mg, Oral, Daily, Jorden Tierney II, MD, 40 mg at 02/08/25 0842    HYDROmorphone (DILAUDID) injection 1 mg, 1 mg, Intramuscular, Q2H PRN **AND** naloxone (NARCAN) injection 0.4 mg, 0.4 mg, Intravenous, Q5 Min PRN, Jorden Tierney II, MD    ondansetron ODT (ZOFRAN-ODT) disintegrating tablet 4 mg, 4 mg, Oral, Q6H PRN, Jorden Tierney II, MD    oxyCODONE (ROXICODONE) immediate release tablet 10 mg, 10 mg, Oral, Q4H PRN, Jroden Tierney II, MD, 10 mg at 02/08/25 1239    oxyCODONE (ROXICODONE) immediate release tablet 5 mg, 5 mg, Oral, Q4H PRN, Jorden Tierney II, MD, 5 mg at 02/08/25 0319    Pharmacy to dose vancomycin, , Not Applicable, Continuous PRN, Jorden Tierney II, MD    sodium chloride 0.9 % flush 1-10 mL, 1-10 mL, Intravenous, PRN, Jorden Tierney II, MD    sodium chloride 0.9 % flush 10 mL, 10 mL, Intravenous, Q12H, Jorden Tierney II, MD, 10 mL at 02/08/25 0894    sodium chloride  "0.9 % infusion 40 mL, 40 mL, Intravenous, PRN, Jorden Tierney II, MD    [COMPLETED] Vancomycin HCl 1,250 mg in sodium chloride 0.9 % 250 mL VTB, 20 mg/kg, Intravenous, Once, Last Rate: 200 mL/hr at 02/07/25 1207, 1,250 mg at 02/07/25 1207 **FOLLOWED BY** vancomycin (VANCOCIN) 1,000 mg in sodium chloride 0.9 % 250 mL IVPB-VTB, 1,000 mg, Intravenous, Q12H, Jorden Tierney II, MD, Last Rate: 250 mL/hr at 02/08/25 1115, 1,000 mg at 02/08/25 1115      Objective     Physical Exam:   Vital Signs   Temp:  [98 °F (36.7 °C)-98.6 °F (37 °C)] 98 °F (36.7 °C)  Heart Rate:  [54-64] 64  Resp:  [16-18] 18  BP: ()/(59-73) 115/73    GENERAL: Awake and alert, NAD, very nice, sitting up in chair  EYES: No scleral icterus  CV: Normal rate  LUNGS: Normal work of breathing.  SKIN: Left knee bandaged  Vascular: RUE PIV without erythema  PSYCHIATRIC: Calm and pleasant    Results Review:  All labs below reviewed today    Lab Results   Component Value Date    WBC 4.67 02/06/2025    HGB 11.5 (L) 02/06/2025    HCT 33.6 (L) 02/06/2025    MCV 86.6 02/06/2025     02/06/2025     Lab Results   Component Value Date    GLUCOSE 121 (H) 02/06/2025    CALCIUM 9.1 02/06/2025     02/06/2025    K 3.6 02/06/2025    CO2 27.0 02/06/2025     02/06/2025    BUN 10 02/06/2025    CREATININE 0.65 02/08/2025    EGFR 102.8 02/08/2025    BCR 14.3 02/06/2025    ANIONGAP 9.0 02/06/2025     Lab Results   Component Value Date    CRP 17.46 (H) 02/04/2025     Lab Results   Component Value Date    HGBA1C 4.80 02/06/2025       No results found for: \"VANCOPEAK\", \"VANCOTROUGH\", \"VANCORANDOM\"    Isolation:   No active isolations      Microbiology:  Microbiology Results (last 10 days)       Procedure Component Value - Date/Time    Wound Culture - Surgical Site, Knee, Left [[id number]] Collected: 02/07/25 0744    Lab Status: Preliminary result Specimen: Surgical Site from Knee, Left Updated: 02/08/25 0811     Wound Culture No growth     " Gram Stain No WBCs or organisms seen    Wound Culture - Surgical Site, Knee, Left [[id number]] Collected: 02/07/25 0744    Lab Status: Preliminary result Specimen: Surgical Site from Knee, Left Updated: 02/08/25 0811     Wound Culture No growth     Gram Stain No WBCs or organisms seen    Wound Culture - Surgical Site, Knee, Left [[id number]] Collected: 02/07/25 0744    Lab Status: Preliminary result Specimen: Surgical Site from Knee, Left Updated: 02/08/25 0811     Wound Culture No growth     Gram Stain No WBCs or organisms seen    Body Fluid Culture - Body Fluid, Knee, Left [[id number]] Collected: 02/04/25 1442    Lab Status: Preliminary result Specimen: Body Fluid from Knee, Left Updated: 02/08/25 0746     Body Fluid Culture No growth at 4 days     Gram Stain No WBCs or organisms seen           2/4 left knee aspiration:  Component      Latest Ref Rng 2/4/2025   Color, Fluid Red    Appearance, Fluid      Clear  Cloudy !    RBC, Fluid      /mm3 100,000    Nucleated Cells, Fluid      /mm3 151,980    Method: Automated Sysmex XN Method    Neutrophils, Fluid      % 52    Lymphocytes, Fluid      % 1    Monocytes, Fluid      % 7    Mononuclear, Fluid      % 40       Legend:  ! Abnormal    Radiology:  XR left knee shows postoperative changes with hardware present    Left knee x-ray report reviewed with postoperative changes.    Assessment   #Left knee PJI s/p irrigation debridement with liner exchange on 2/7/2025  #Long-term use of antibiotics  #Elevated C-reactive protein  #History of left total knee replacement 6/5/2024    So far her aspiration and operative cultures are negative to date.  I recommend that she continue empiric vancomycin IV with goal -600 and ceftriaxone 2 g IV every 24 hours while awaiting operative culture results.  She is most likely looking at a 6 weeks course of antibiotics.  If cultures remain negative as of tomorrow then I will go ahead and order a single-lumen PICC line.    While the patient  is on vancomycin, vancomycin levels will be ordered and reviewed with dose adjustments made as needed. The patient will have a daily creatinine level checked while on vancomycin as part of monitoring this medication.  Vancomycin level is due tomorrow.    I will check an updated postoperative CRP.    ID will follow.

## 2025-02-08 NOTE — PLAN OF CARE
Goal Outcome Evaluation:  Plan of Care Reviewed With: patient        Progress: improving    A/Ox4. No s/s distress. POD#1 L TKA w/ polyexchange, w/ hemovac, drained 50ml this 12H shift. Pt continues on Rocephin and Vanc. WBAT. Assist x1 w/ walker. BRP. Ice pack applied to the L knee. Pain controlled w/ PRN Roxicodone. Needs attended.

## 2025-02-08 NOTE — PROGRESS NOTES
"Orthopaedic Surgery  Daily Progress Note    BP 94/59 (BP Location: Right arm, Patient Position: Lying)   Pulse 59   Temp 98 °F (36.7 °C) (Oral)   Resp 17   Ht 161.3 cm (63.5\")   Wt 63 kg (138 lb 14.2 oz)   SpO2 96%   BMI 24.22 kg/m²     Lab Results (last 24 hours)       Procedure Component Value Units Date/Time    Creatinine Serum (kidney function) GFR component [[id number]]  (Normal) Collected: 02/08/25 0301    Specimen: Blood Updated: 02/08/25 0358     Creatinine 0.65 mg/dL      eGFR 102.8 mL/min/1.73     Narrative:      GFR Categories in Chronic Kidney Disease (CKD)      GFR Category          GFR (mL/min/1.73)    Interpretation  G1                     90 or greater         Normal or high (1)  G2                      60-89                Mild decrease (1)  G3a                   45-59                Mild to moderate decrease  G3b                   30-44                Moderate to severe decrease  G4                    15-29                Severe decrease  G5                    14 or less           Kidney failure          (1)In the absence of evidence of kidney disease, neither GFR category G1 or G2 fulfill the criteria for CKD.    eGFR calculation 2021 CKD-EPI creatinine equation, which does not include race as a factor    Wound Culture - Surgical Site, Knee, Left [[id number]] Collected: 02/07/25 0744    Specimen: Surgical Site from Knee, Left Updated: 02/07/25 1418     Gram Stain No WBCs or organisms seen    Wound Culture - Surgical Site, Knee, Left [[id number]] Collected: 02/07/25 0744    Specimen: Surgical Site from Knee, Left Updated: 02/07/25 1415     Gram Stain No WBCs or organisms seen    Wound Culture - Surgical Site, Knee, Left [[id number]] Collected: 02/07/25 0744    Specimen: Surgical Site from Knee, Left Updated: 02/07/25 1414     Gram Stain No WBCs or organisms seen            Imaging Results (Last 24 Hours)       ** No results found for the last 24 hours. **            Patient Care Team:  Ekta" Sadaf ALANIZ MD as PCP - General (Family Medicine)    SUBJECTIVE  Pain controlled    PHYSICAL EXAM  Resting in NAD  Dressing clean, dry, intact  Toes warm, perfused, brisk capillary refill  Flexes/extends toes   SILT over toes  No pain with passive stretch    Drain intact         Infected prosthetic knee joint      PLAN / DISPOSITION:  POD 1 status post I&D with poly exchange, left total knee replacement follow-up operative cultures.  No growth to date.    Infectious disease following along.  Patient empirically on vancomycin and ceftriaxone.  Follow-up any recommendations.    Leave drain for now      1. Pain control: Orals  2.  Antibiotics: Per above  3.  PT: Weightbearing as tolerated  4. DVT: Aspirin 81 mg daily plus AMRIT/SCDs, mobilization  5. Dispo: Pending      Mamadou Lucero Jr, MD  02/08/25  07:36 EST

## 2025-02-08 NOTE — THERAPY TREATMENT NOTE
Patient Name: Ila FRANCO  : 1965    MRN: [id number]                              Today's Date: 2025       Admit Date: 2025    Visit Dx:     ICD-10-CM ICD-9-CM   1. History of knee replacement procedure of left knee  Z96.652 V43.65     Patient Active Problem List   Diagnosis    Infected prosthetic knee joint     Past Medical History:   Diagnosis Date    Arthritis     Infection of orthopedic implant     LEFT    Limited mobility      Past Surgical History:   Procedure Laterality Date    BREAST BIOPSY      COLONOSCOPY      DENTAL PROCEDURE      BRIDGE & IMPLANT    DILATATION AND CURETTAGE      ENDOMETRIAL ABLATION      KNEE ARTHROPLASTY Left 2024    [place]      General Information       Row Name 25 1131          Physical Therapy Time and Intention    Document Type therapy note (daily note)  -CB     Mode of Treatment individual therapy;physical therapy  -CB       Row Name 25 1131          General Information    Existing Precautions/Restrictions fall  -CB       Row Name 25 1131          Cognition    Orientation Status (Cognition) oriented x 4  -CB       Row Name 25 1131          Safety Issues/Impairments Affecting Functional Mobility    Impairments Affecting Function (Mobility) endurance/activity tolerance;strength;pain;range of motion (ROM)  -CB               User Key  (r) = Recorded By, (t) = Taken By, (c) = Cosigned By      Initials Name Provider Type    CB Tammy Bacon PT Physical Therapist                   Mobility       Row Name 25 1132          Bed Mobility    Bed Mobility supine-sit  -CB     Supine-Sit [place] (Bed Mobility) modified independence  -CB     Assistive Device (Bed Mobility) head of bed elevated  -CB       Row Name 25 1132          Sit-Stand Transfer    Sit-Stand [place] (Transfers) standby assist  -CB     Assistive Device (Sit-Stand Transfers) walker, front-wheeled  -CB       Row Name 25 1132           Gait/Stairs (Locomotion)    [place] Level (Gait) standby assist  -CB     Assistive Device (Gait) walker, front-wheeled  -CB     Distance in Feet (Gait) 200  -CB     Deviations/Abnormal Patterns (Gait) gait speed decreased;stride length decreased  -CB     Left Sided Gait Deviations weight shift ability decreased;hip hiking;heel strike decreased  decreased knee flexion  -CB     [place] Level (Stairs) contact guard;verbal cues  -CB     Handrail Location (Stairs) right side (ascending)  -CB     Number of Steps (Stairs) 4  -CB     Ascending Technique (Stairs) step-to-step  -CB     Descending Technique (Stairs) step-to-step  -CB     Comment, (Gait/Stairs) VC and demo for heel strike and toe off. improved with distance; no LOB or unsteadiness noted  -CB               User Key  (r) = Recorded By, (t) = Taken By, (c) = Cosigned By      Initials Name Provider Type    Tammy Rowe, DANAE Physical Therapist                   Obj/Interventions       Row Name 02/08/25 1133          Motor Skills    Therapeutic Exercise --  TKA protocol x10 reps  -CB       Row Name 02/08/25 1133          Balance    Balance Assessment standing static balance;standing dynamic balance  -CB     Static Standing Balance modified independence  -CB     Dynamic Standing Balance standby assist  -CB     Position/Device Used, Standing Balance supported;walker, front-wheeled  -CB     Balance Interventions standing;sit to stand;supported;static;minimal challenge;dynamic  -CB               User Key  (r) = Recorded By, (t) = Taken By, (c) = Cosigned By      Initials Name Provider Type    Tammy Rowe, PT Physical Therapist                   Goals/Plan    No documentation.                  Clinical Impression       Row Name 02/08/25 1133          Pain    Pretreatment Pain Rating 5/10  -CB     Posttreatment Pain Rating 5/10  -CB     Pain Location knee  -CB     Pain Side/Orientation left  -CB       Row Name 02/08/25 1133          Plan of Care Review     Plan of Care Reviewed With patient  -CB     Progress improving  -CB     Outcome Evaluation Patient seen this AM for PT and continues to improve overall functional mobility. She completed STS with Jerome and then increased ambulation distance to 200ft using rwx requiring SBA. NO LOB. VC and demo for heel strike and toe off as pt does hip hike and circumduct at times. Pt completed TKA protocol and continues to have significant limitations in knee flexion. Will progress as pt tolerates. Pt should be ambulating with nsg staff 3x/day in hallway.  -CB       Row Name 02/08/25 1133          Therapy Assessment/Plan (PT)    Therapy Frequency (PT) 6 times/wk  -CB       Row Name 02/08/25 1133          Positioning and Restraints    Pre-Treatment Position in bed  -CB     Post Treatment Position chair  -CB     In Chair notified nsg;reclined;call light within reach;encouraged to call for assist;exit alarm on;with family/caregiver  -CB               User Key  (r) = Recorded By, (t) = Taken By, (c) = Cosigned By      Initials Name Provider Type    CB Tammy Bacon, PT Physical Therapist                   Outcome Measures       Row Name 02/08/25 1135          How much help from another person do you currently need...    Turning from your back to your side while in flat bed without using bedrails? 4  -CB     Moving from lying on back to sitting on the side of a flat bed without bedrails? 4  -CB     Moving to and from a bed to a chair (including a wheelchair)? 3  -CB     Standing up from a chair using your arms (e.g., wheelchair, bedside chair)? 3  -CB     Climbing 3-5 steps with a railing? 3  -CB     To walk in hospital room? 3  -CB     AM-PAC 6 Clicks Score (PT) 20  -CB     Highest Level of Mobility Goal 6 --> Walk 10 steps or more  -CB       Row Name 02/08/25 113          Functional Assessment    Outcome Measure Options AM-PAC 6 Clicks Basic Mobility (PT)  -CB               User Key  (r) = Recorded By, (t) = Taken By, (c) =  Cosigned By      Initials Name Provider Type    CB Tammy Bacon PT Physical Therapist                                 Physical Therapy Education       Title: PT OT SLP Therapies (Done)       Topic: Physical Therapy (Done)       Point: Mobility training (Done)       Learning Progress Summary            Patient Acceptance, E,TB,D, VU,NR by CB at 2/8/2025 1137    Acceptance, E,TB,D, VU,NR by CB at 2/7/2025 1549                      Point: Home exercise program (Done)       Learning Progress Summary            Patient Acceptance, E,TB,D, VU,NR by CB at 2/8/2025 1137    Acceptance, E,TB,D, VU,NR by CB at 2/7/2025 1549                      Point: Body mechanics (Done)       Learning Progress Summary            Patient Acceptance, E,TB,D, VU,NR by  at 2/8/2025 1137    Acceptance, E,TB,D, VU,NR by CB at 2/7/2025 1549                      Point: Precautions (Done)       Learning Progress Summary            Patient Acceptance, E,TB,D, VU,NR by  at 2/8/2025 1137    Acceptance, E,TB,D, VU,NR by CB at 2/7/2025 1549                                      User Key       Initials Effective Dates Name Provider Type Discipline     10/22/21 -  Tammy Bacon PT Physical Therapist PT                  PT Recommendation and Plan  Planned Therapy Interventions (PT): balance training, bed mobility training, gait training, home exercise program, patient/family education, strengthening, transfer training, ROM (range of motion), stair training  Progress: improving  Outcome Evaluation: Patient seen this AM for PT and continues to improve overall functional mobility. She completed STS with Jerome and then increased ambulation distance to 200ft using rwx requiring SBA. NO LOB. VC and demo for heel strike and toe off as pt does hip hike and circumduct at times. Pt completed TKA protocol and continues to have significant limitations in knee flexion. Will progress as pt tolerates. Pt should be ambulating with [facility] staff 3x/day in hallway.      Time Calculation:         PT Charges       Row Name 02/08/25 1137             Time Calculation    Start Time 0959  -CB      Stop Time 1022  -CB      Time Calculation (min) 23 min  -CB      PT Received On 02/08/25  -CB      PT - Next Appointment 02/10/25  -CB         Time Calculation- PT    Total Timed Code Minutes- PT 23 minute(s)  -CB         Timed Charges    20075 - PT Therapeutic Exercise Minutes 8  -CB      28789 - PT Therapeutic Activity Minutes 15  -CB         Total Minutes    Timed Charges Total Minutes 23  -CB       Total Minutes 23  -CB                User Key  (r) = Recorded By, (t) = Taken By, (c) = Cosigned By      Initials Name Provider Type    CB Tammy Bacon, PT Physical Therapist                  Therapy Charges for Today       Code Description Service Date Service Provider Modifiers Qty    97766069477 HC PT THER PROC EA 15 MIN 2/7/2025 Tammy Bacon, PT GP 1    48599801690 HC PT EVAL LOW COMPLEXITY 3 2/7/2025 Tammy Bacon, PT GP 1    99030653401 HC PT THER PROC EA 15 MIN 2/8/2025 Tammy Bacon, PT GP 1    36790497551 HC PT THERAPEUTIC ACT EA 15 MIN 2/8/2025 Tammy Bacon, PT GP 1            PT G-Codes  Outcome Measure Options: AM-PAC 6 Clicks Basic Mobility (PT)  AM-PAC 6 Clicks Score (PT): 20  PT Discharge Summary  Anticipated Discharge Disposition (PT): home with home health, home with assist    Tammy Bacon, DANAE  2/8/2025

## 2025-02-08 NOTE — PLAN OF CARE
Goal Outcome Evaluation:  Plan of Care Reviewed With: patient        Progress: improving  Outcome Evaluation: Patient seen this AM for PT and continues to improve overall functional mobility. She completed STS with Jerome and then increased ambulation distance to 200ft using rwx requiring SBA. NO LOB. VC and demo for heel strike and toe off as pt does hip hike and circumduct at times. Pt completed TKA protocol and continues to have significant limitations in knee flexion. Will progress as pt tolerates. Pt should be ambulating with [facility] staff 3x/day in hallway.    Anticipated Discharge Disposition (PT): home with home health, home with assist

## 2025-02-09 LAB
ANION GAP SERPL CALCULATED.3IONS-SCNC: 10.6 MMOL/L (ref 5–15)
BACTERIA FLD CULT: NORMAL
BUN SERPL-MCNC: 18 MG/DL (ref 6–20)
BUN/CREAT SERPL: 21.2 (ref 7–25)
CALCIUM SPEC-SCNC: 9.2 MG/DL (ref 8.6–10.5)
CHLORIDE SERPL-SCNC: 102 MMOL/L (ref 98–107)
CO2 SERPL-SCNC: 27.4 MMOL/L (ref 22–29)
CREAT SERPL-MCNC: 0.85 MG/DL (ref 0.57–1)
DEPRECATED RDW RBC AUTO: 39.8 FL (ref 37–54)
EGFRCR SERPLBLD CKD-EPI 2021: 80 ML/MIN/1.73
ERYTHROCYTE [DISTWIDTH] IN BLOOD BY AUTOMATED COUNT: 12.2 % (ref 12.3–15.4)
GLUCOSE SERPL-MCNC: 95 MG/DL (ref 65–99)
GRAM STN SPEC: NORMAL
HCT VFR BLD AUTO: 34.9 % (ref 34–46.6)
HGB BLD-MCNC: 11.1 G/DL (ref 12–15.9)
MCH RBC QN AUTO: 28.4 PG (ref 26.6–33)
MCHC RBC AUTO-ENTMCNC: 31.8 G/DL (ref 31.5–35.7)
MCV RBC AUTO: 89.3 FL (ref 79–97)
PLATELET # BLD AUTO: 272 10*3/MM3 (ref 140–450)
PMV BLD AUTO: 8.7 FL (ref 6–12)
POTASSIUM SERPL-SCNC: 4 MMOL/L (ref 3.5–5.2)
RBC # BLD AUTO: 3.91 10*6/MM3 (ref 3.77–5.28)
SODIUM SERPL-SCNC: 140 MMOL/L (ref 136–145)
VANCOMYCIN TROUGH SERPL-MCNC: 12.3 MCG/ML (ref 5–20)
WBC NRBC COR # BLD AUTO: 6.61 10*3/MM3 (ref 3.4–10.8)

## 2025-02-09 PROCEDURE — 25010000002 KETOROLAC TROMETHAMINE PER 15 MG: Performed by: NURSE PRACTITIONER

## 2025-02-09 PROCEDURE — 25010000002 CEFTRIAXONE PER 250 MG: Performed by: ORTHOPAEDIC SURGERY

## 2025-02-09 PROCEDURE — G0378 HOSPITAL OBSERVATION PER HR: HCPCS

## 2025-02-09 PROCEDURE — 99215 OFFICE O/P EST HI 40 MIN: CPT | Performed by: INTERNAL MEDICINE

## 2025-02-09 PROCEDURE — 25010000002 VANCOMYCIN 1 G RECONSTITUTED SOLUTION 1 EACH VIAL: Performed by: ORTHOPAEDIC SURGERY

## 2025-02-09 PROCEDURE — 80048 BASIC METABOLIC PNL TOTAL CA: CPT | Performed by: INTERNAL MEDICINE

## 2025-02-09 PROCEDURE — 80202 ASSAY OF VANCOMYCIN: CPT | Performed by: ORTHOPAEDIC SURGERY

## 2025-02-09 PROCEDURE — 85027 COMPLETE CBC AUTOMATED: CPT | Performed by: INTERNAL MEDICINE

## 2025-02-09 PROCEDURE — 25810000003 SODIUM CHLORIDE 0.9 % SOLUTION 250 ML FLEX CONT: Performed by: ORTHOPAEDIC SURGERY

## 2025-02-09 RX ADMIN — VANCOMYCIN HYDROCHLORIDE 1000 MG: 1 INJECTION, POWDER, LYOPHILIZED, FOR SOLUTION INTRAVENOUS at 11:56

## 2025-02-09 RX ADMIN — Medication 10 ML: at 09:02

## 2025-02-09 RX ADMIN — OXYCODONE HYDROCHLORIDE 10 MG: 5 TABLET ORAL at 13:07

## 2025-02-09 RX ADMIN — CEFTRIAXONE 2000 MG: 2 INJECTION, POWDER, FOR SOLUTION INTRAMUSCULAR; INTRAVENOUS at 13:06

## 2025-02-09 RX ADMIN — ACETAMINOPHEN 650 MG: 325 TABLET, FILM COATED ORAL at 09:01

## 2025-02-09 RX ADMIN — FAMOTIDINE 40 MG: 20 TABLET, FILM COATED ORAL at 09:01

## 2025-02-09 RX ADMIN — ACETAMINOPHEN 650 MG: 325 TABLET, FILM COATED ORAL at 03:26

## 2025-02-09 RX ADMIN — VANCOMYCIN HYDROCHLORIDE 1000 MG: 1 INJECTION, POWDER, LYOPHILIZED, FOR SOLUTION INTRAVENOUS at 23:26

## 2025-02-09 RX ADMIN — ASPIRIN 81 MG: 81 TABLET, DELAYED RELEASE ORAL at 21:41

## 2025-02-09 RX ADMIN — OXYCODONE HYDROCHLORIDE 10 MG: 5 TABLET ORAL at 21:41

## 2025-02-09 RX ADMIN — ASPIRIN 81 MG: 81 TABLET, DELAYED RELEASE ORAL at 09:01

## 2025-02-09 RX ADMIN — ACETAMINOPHEN 650 MG: 325 TABLET, FILM COATED ORAL at 13:07

## 2025-02-09 RX ADMIN — ACETAMINOPHEN 650 MG: 325 TABLET, FILM COATED ORAL at 21:40

## 2025-02-09 RX ADMIN — OXYCODONE HYDROCHLORIDE 10 MG: 5 TABLET ORAL at 17:27

## 2025-02-09 RX ADMIN — Medication 10 ML: at 21:00

## 2025-02-09 RX ADMIN — ACETAMINOPHEN 650 MG: 325 TABLET, FILM COATED ORAL at 17:27

## 2025-02-09 RX ADMIN — OXYCODONE HYDROCHLORIDE 10 MG: 5 TABLET ORAL at 09:01

## 2025-02-09 RX ADMIN — KETOROLAC TROMETHAMINE 30 MG: 30 INJECTION, SOLUTION INTRAMUSCULAR; INTRAVENOUS at 03:28

## 2025-02-09 NOTE — PLAN OF CARE
Goal Outcome Evaluation:  Plan of Care Reviewed With: patient        Progress: improving    A/Ox4. Up ad abdias w/ walker. No s/s distress. Pt continues on Rocephin and Vanc. SLIV to the L FA. Pain controlled w/ PRN Roxicodone and Toradol. Hemovac drained 35ml total this 12hr shift.Per pt report she had a one time watery stools this evening. Needs attended.

## 2025-02-09 NOTE — PROGRESS NOTES
Patient doing well from orthopedic standpoint.  Infectious disease is likely to make final recommendations today.  If that is the case, she may discharge later today after PICC line placement.

## 2025-02-09 NOTE — PLAN OF CARE
Goal Outcome Evaluation:  Plan of Care Reviewed With: patient        Progress: improving  Outcome Evaluation: Patient stable, VSS and voiding function is intact. Pain managed with prn oxycodone and scheduled tylenol. IV abx continued, rocephin and vancomycin, cx still pending. ID following, plan for PICC and IV abx at d/c. Ambulating with walker and stand by assist. Patient anticipated d/c tomorrow hopefully. HV drain remains in place.

## 2025-02-09 NOTE — PROGRESS NOTES
"ID progress note     CC: Follow-up left knee PJI    Subjective: No acute events.  No fever.  She is tolerating vancomycin and ceftriaxone without any side effects.  Cultures are now showing some growth with \"in progress\" status.  Peripheral IV change from right arm to left arm in the past 24 hours.  No erythema or swelling at prior site.    Medications:    Current Facility-Administered Medications:     acetaminophen (TYLENOL) tablet 650 mg, 650 mg, Oral, Q4H, Blanche Hall APRN, 650 mg at 02/09/25 0901    aspirin EC tablet 81 mg, 81 mg, Oral, BID, Mamadou Lucero Jr., MD, 81 mg at 02/09/25 0901    sennosides-docusate (PERICOLACE) 8.6-50 MG per tablet 2 tablet, 2 tablet, Oral, BID, 2 tablet at 02/08/25 0842 **AND** polyethylene glycol (MIRALAX) packet 17 g, 17 g, Oral, Daily PRN **AND** bisacodyl (DULCOLAX) EC tablet 5 mg, 5 mg, Oral, Daily PRN **AND** bisacodyl (DULCOLAX) suppository 10 mg, 10 mg, Rectal, Daily PRN, Jorden Tierney II, MD    cefTRIAXone (ROCEPHIN) 2,000 mg in sodium chloride 0.9 % 100 mL MBP, 2,000 mg, Intravenous, Q24H, Jorden Tierney II, MD, Stopped at 02/08/25 1900    famotidine (PEPCID) tablet 40 mg, 40 mg, Oral, Daily, Jorden Tierney II, MD, 40 mg at 02/09/25 0901    HYDROmorphone (DILAUDID) injection 0.5 mg, 0.5 mg, Intravenous, Q4H PRN, Blanche Hall APRN    ketorolac (TORADOL) injection 30 mg, 30 mg, Intravenous, Q6H PRN, Blanche Hall APRN, 30 mg at 02/09/25 0328    [DISCONTINUED] HYDROmorphone (DILAUDID) injection 1 mg, 1 mg, Intramuscular, Q2H PRN **AND** naloxone (NARCAN) injection 0.4 mg, 0.4 mg, Intravenous, Q5 Min PRN, Jorden Tierney II, MD    ondansetron ODT (ZOFRAN-ODT) disintegrating tablet 4 mg, 4 mg, Oral, Q6H PRN, Jorden Tierney II, MD    oxyCODONE (ROXICODONE) immediate release tablet 10 mg, 10 mg, Oral, Q4H PRN, Jorden Tierney II, MD, 10 mg at 02/09/25 0901    oxyCODONE (ROXICODONE) immediate release " tablet 5 mg, 5 mg, Oral, Q4H PRN, Jorden Tierney II, MD, 5 mg at 02/08/25 2041    Pharmacy to dose vancomycin, , Not Applicable, Continuous PRN, Jorden Tierney II, MD    sodium chloride 0.9 % flush 1-10 mL, 1-10 mL, Intravenous, PRN, Jorden Tierney II, MD    sodium chloride 0.9 % flush 10 mL, 10 mL, Intravenous, Q12H, Jorden Tierney II, MD, 10 mL at 02/09/25 0902    sodium chloride 0.9 % infusion 40 mL, 40 mL, Intravenous, PRN, Jorden Tierney II, MD    [COMPLETED] Vancomycin HCl 1,250 mg in sodium chloride 0.9 % 250 mL VTB, 20 mg/kg, Intravenous, Once, Last Rate: 200 mL/hr at 02/07/25 1207, 1,250 mg at 02/07/25 1207 **FOLLOWED BY** vancomycin (VANCOCIN) 1,000 mg in sodium chloride 0.9 % 250 mL IVPB-VTB, 1,000 mg, Intravenous, Q12H, Jorden Tierney II, MD, Last Rate: 250 mL/hr at 02/09/25 1156, 1,000 mg at 02/09/25 1156      Objective     Physical Exam:   Vital Signs   Temp:  [97.5 °F (36.4 °C)-98.9 °F (37.2 °C)] 97.5 °F (36.4 °C)  Heart Rate:  [55-83] 61  Resp:  [17-18] 18  BP: (102-110)/(66-74) 110/74    GENERAL: Awake and alert, NAD, very nice, sitting up in chair  EYES: No scleral icterus  CV: NR  LUNGS: Normal work of breathing on ambient air  SKIN: Left knee bandaged  Vascular: Left UE PIV without erythema  PSYCHIATRIC: Calm and pleasant    Results Review:  CBC, BMP, CRP, vancomycin level, and wound cultures reviewed today    Lab Results   Component Value Date    WBC 6.61 02/09/2025    HGB 11.1 (L) 02/09/2025    HCT 34.9 02/09/2025    MCV 89.3 02/09/2025     02/09/2025     Lab Results   Component Value Date    GLUCOSE 95 02/09/2025    CALCIUM 9.2 02/09/2025     02/09/2025    K 4.0 02/09/2025    CO2 27.4 02/09/2025     02/09/2025    BUN 18 02/09/2025    CREATININE 0.85 02/09/2025    EGFR 80.0 02/09/2025    BCR 21.2 02/09/2025    ANIONGAP 10.6 02/09/2025     Lab Results   Component Value Date    CRP 4.20 (H) 02/08/2025     Lab Results  "  Component Value Date    HGBA1C 4.80 02/06/2025       Lab Results   Component Value Date    LISSETH 12.30 02/09/2025       Isolation:   No active isolations      Microbiology:  2/4 left knee body fluid Cx: Negative to date  2/7 left knee OR Cx: \"Culture in progress\"      Assessment   #Left knee PJI s/p irrigation debridement with liner exchange on 2/7/2025  #Long-term use of antibiotics  #Elevated C-reactive protein  #History of left total knee replacement 6/5/2024    On 2/7/2025 she went to the operating room with orthopedic surgery for left knee debridement with polyethylene liner exchange.  Cultures updates today as \"in progress.\"  This suggest that growth is present.  Therefore I recommend that we continue vancomycin 1 g IV every 12 hours with goal -600 and ceftriaxone 2 g IV every 24 hours while awaiting the identification and susceptibilities which will hopefully be back tomorrow.    I told her it is still most likely she will be going home with a PICC line but lets wait another day before ordering 1 in order to make sure an oral option is not available.    While the patient is on vancomycin, vancomycin levels will be ordered and reviewed with dose adjustments made as needed. The patient will have a daily creatinine level checked while on vancomycin as part of monitoring this medication.  Vancomycin level is due tomorrow.    ID will follow.  D/W Dr. Tierney.    "

## 2025-02-09 NOTE — PROGRESS NOTES
"[facility] Clinical Pharmacy Services: Vancomycin Monitoring Note    Ila FRANCO is a 59 y.o. female who is on day 2 of pharmacy to dose vancomycin for Bone and/or Joint Infection.    Current Vancomycin Dose:    1000 mg IV every  12  hours  Updated Cultures and Sensitivities:   2/4 bld cx NGTD  2/7 wound cx NG prelim  Results from last 7 days   Lab Units 02/09/25  1057   VANCOMYCIN TR mcg/mL 12.30     Vitals/Labs  Ht: 161.3 cm (63.5\"); Wt: 63 kg (138 lb 14.2 oz)   Temp Readings from Last 1 Encounters:   02/09/25 97.5 °F (36.4 °C) (Oral)     Estimated Creatinine Clearance: 72.6 mL/min (by C-G formula based on SCr of 0.85 mg/dL).     Results from last 7 days   Lab Units 02/09/25  1057 02/08/25  0301 02/06/25 2009 02/04/25  1441   CREATININE mg/dL 0.85 0.65 0.70 0.90   WBC 10*3/mm3 6.61  --  4.67 10.30     Assessment/Plan    Doses given as scheduled, SCr stable, vanc level appropriate prior to 3rd dose.  Continue Current.    Vancomycin Dose: Continue 1000 mg IV every  12  hours; provides a predicted  mg/L.hr  (goal 400-600)  Next Level Date and Time: will not order at this time  We will continue to monitor patient changes and renal function     Thank you for involving pharmacy in this patient's care. Please contact pharmacy with any questions or concerns.       Erika Shultz, Pharm.D., [facility]  Clinical Pharmacist           "

## 2025-02-10 LAB
ANION GAP SERPL CALCULATED.3IONS-SCNC: 6.4 MMOL/L (ref 5–15)
BUN SERPL-MCNC: 16 MG/DL (ref 6–20)
BUN/CREAT SERPL: 19.8 (ref 7–25)
CALCIUM SPEC-SCNC: 8.7 MG/DL (ref 8.6–10.5)
CHLORIDE SERPL-SCNC: 105 MMOL/L (ref 98–107)
CO2 SERPL-SCNC: 27.6 MMOL/L (ref 22–29)
CREAT SERPL-MCNC: 0.81 MG/DL (ref 0.57–1)
EGFRCR SERPLBLD CKD-EPI 2021: 84.8 ML/MIN/1.73
GLUCOSE SERPL-MCNC: 93 MG/DL (ref 65–99)
POTASSIUM SERPL-SCNC: 4.6 MMOL/L (ref 3.5–5.2)
SODIUM SERPL-SCNC: 139 MMOL/L (ref 136–145)

## 2025-02-10 PROCEDURE — 99232 SBSQ HOSP IP/OBS MODERATE 35: CPT | Performed by: STUDENT IN AN ORGANIZED HEALTH CARE EDUCATION/TRAINING PROGRAM

## 2025-02-10 PROCEDURE — 25010000002 VANCOMYCIN 1 G RECONSTITUTED SOLUTION 1 EACH VIAL: Performed by: ORTHOPAEDIC SURGERY

## 2025-02-10 PROCEDURE — 25810000003 SODIUM CHLORIDE 0.9 % SOLUTION 250 ML FLEX CONT: Performed by: ORTHOPAEDIC SURGERY

## 2025-02-10 PROCEDURE — 80048 BASIC METABOLIC PNL TOTAL CA: CPT | Performed by: INTERNAL MEDICINE

## 2025-02-10 PROCEDURE — 25010000002 CEFTRIAXONE PER 250 MG: Performed by: ORTHOPAEDIC SURGERY

## 2025-02-10 RX ADMIN — Medication 10 ML: at 08:33

## 2025-02-10 RX ADMIN — ACETAMINOPHEN 650 MG: 325 TABLET, FILM COATED ORAL at 21:05

## 2025-02-10 RX ADMIN — ASPIRIN 81 MG: 81 TABLET, DELAYED RELEASE ORAL at 21:11

## 2025-02-10 RX ADMIN — FAMOTIDINE 40 MG: 20 TABLET, FILM COATED ORAL at 08:32

## 2025-02-10 RX ADMIN — OXYCODONE HYDROCHLORIDE 10 MG: 5 TABLET ORAL at 03:48

## 2025-02-10 RX ADMIN — VANCOMYCIN HYDROCHLORIDE 1000 MG: 1 INJECTION, POWDER, LYOPHILIZED, FOR SOLUTION INTRAVENOUS at 10:34

## 2025-02-10 RX ADMIN — Medication 10 ML: at 21:06

## 2025-02-10 RX ADMIN — ACETAMINOPHEN 650 MG: 325 TABLET, FILM COATED ORAL at 16:34

## 2025-02-10 RX ADMIN — ACETAMINOPHEN 650 MG: 325 TABLET, FILM COATED ORAL at 08:32

## 2025-02-10 RX ADMIN — OXYCODONE HYDROCHLORIDE 10 MG: 5 TABLET ORAL at 21:05

## 2025-02-10 RX ADMIN — OXYCODONE HYDROCHLORIDE 10 MG: 5 TABLET ORAL at 16:34

## 2025-02-10 RX ADMIN — ACETAMINOPHEN 650 MG: 325 TABLET, FILM COATED ORAL at 03:48

## 2025-02-10 RX ADMIN — ASPIRIN 81 MG: 81 TABLET, DELAYED RELEASE ORAL at 08:32

## 2025-02-10 RX ADMIN — ACETAMINOPHEN 650 MG: 325 TABLET, FILM COATED ORAL at 12:10

## 2025-02-10 RX ADMIN — VANCOMYCIN HYDROCHLORIDE 1000 MG: 1 INJECTION, POWDER, LYOPHILIZED, FOR SOLUTION INTRAVENOUS at 23:39

## 2025-02-10 RX ADMIN — OXYCODONE HYDROCHLORIDE 10 MG: 5 TABLET ORAL at 08:32

## 2025-02-10 RX ADMIN — CEFTRIAXONE 2000 MG: 2 INJECTION, POWDER, FOR SOLUTION INTRAMUSCULAR; INTRAVENOUS at 12:10

## 2025-02-10 RX ADMIN — OXYCODONE HYDROCHLORIDE 10 MG: 5 TABLET ORAL at 12:10

## 2025-02-10 NOTE — CASE MANAGEMENT/SOCIAL WORK
Continued Stay Note  [facility]     Patient Name: Ila FRANCO  MRN: [id number]  Today's Date: 2/10/2025    Admit Date: 2/6/2025    Plan: Home with [facility].   Discharge Plan       Row Name 02/10/25 1431       Plan    Plan Home with [facility].    Patient/Family in Agreement with Plan yes    Plan Comments Home with [facility] for IV abx. Possible PICC line placement. Once PICC is placed and final IV abx decisions are made then [facility] will come teach the patient on how to do the abx and deliver them. Patient is agreeable to go to clinic for PICC care and labs. CCP following.                   Discharge Codes    No documentation.

## 2025-02-10 NOTE — PROGRESS NOTES
ID progress note     CC: Follow-up left knee PJI    Subjective: S patient reports slight amount of diarrhea this morning but not a copious amount.  States her bowel movements are slightly loose.  Tolerating antibiotics otherwise.      Medications:    Current Facility-Administered Medications:     acetaminophen (TYLENOL) tablet 650 mg, 650 mg, Oral, Q4H, Blanche Hall APRN, 650 mg at 02/10/25 1210    aspirin EC tablet 81 mg, 81 mg, Oral, BID, Mamadou Lucero Jr., MD, 81 mg at 02/10/25 0832    sennosides-docusate (PERICOLACE) 8.6-50 MG per tablet 2 tablet, 2 tablet, Oral, BID, 2 tablet at 02/08/25 0842 **AND** polyethylene glycol (MIRALAX) packet 17 g, 17 g, Oral, Daily PRN **AND** bisacodyl (DULCOLAX) EC tablet 5 mg, 5 mg, Oral, Daily PRN **AND** bisacodyl (DULCOLAX) suppository 10 mg, 10 mg, Rectal, Daily PRN, Jorden Tierney II, MD    cefTRIAXone (ROCEPHIN) 2,000 mg in sodium chloride 0.9 % 100 mL MBP, 2,000 mg, Intravenous, Q24H, Jorden Tierney II, MD, Last Rate: 200 mL/hr at 02/10/25 1210, 2,000 mg at 02/10/25 1210    famotidine (PEPCID) tablet 40 mg, 40 mg, Oral, Daily, Jroden Tierney II, MD, 40 mg at 02/10/25 0832    HYDROmorphone (DILAUDID) injection 0.5 mg, 0.5 mg, Intravenous, Q4H PRN, Blanche Hall APRN    ketorolac (TORADOL) injection 30 mg, 30 mg, Intravenous, Q6H PRN, Blanche Hall APRN, 30 mg at 02/09/25 0328    [DISCONTINUED] HYDROmorphone (DILAUDID) injection 1 mg, 1 mg, Intramuscular, Q2H PRN **AND** naloxone (NARCAN) injection 0.4 mg, 0.4 mg, Intravenous, Q5 Min PRN, Jorden Tierney II, MD    ondansetron ODT (ZOFRAN-ODT) disintegrating tablet 4 mg, 4 mg, Oral, Q6H PRN, Jorden Tierney II, MD    oxyCODONE (ROXICODONE) immediate release tablet 10 mg, 10 mg, Oral, Q4H PRN, Jorden Tierney II, MD, 10 mg at 02/10/25 1210    oxyCODONE (ROXICODONE) immediate release tablet 5 mg, 5 mg, Oral, Q4H PRN, Jorden Tierney II, MD, 5 mg  at 02/08/25 2041    Pharmacy to dose vancomycin, , Not Applicable, Continuous PRN, Jorden Tierney II, MD    sodium chloride 0.9 % flush 1-10 mL, 1-10 mL, Intravenous, PRN, Jorden Tierney II, MD    sodium chloride 0.9 % flush 10 mL, 10 mL, Intravenous, Q12H, Jorden Tierney II, MD, 10 mL at 02/10/25 0833    sodium chloride 0.9 % infusion 40 mL, 40 mL, Intravenous, PRN, Jorden Tierney II, MD    [COMPLETED] Vancomycin HCl 1,250 mg in sodium chloride 0.9 % 250 mL VTB, 20 mg/kg, Intravenous, Once, Last Rate: 200 mL/hr at 02/07/25 1207, 1,250 mg at 02/07/25 1207 **FOLLOWED BY** vancomycin (VANCOCIN) 1,000 mg in sodium chloride 0.9 % 250 mL IVPB-VTB, 1,000 mg, Intravenous, Q12H, Jorden Tierney II, MD, Last Rate: 250 mL/hr at 02/10/25 1034, 1,000 mg at 02/10/25 1034      Objective     Physical Exam:   Vital Signs   Temp:  [97.8 °F (36.6 °C)-98.2 °F (36.8 °C)] 97.9 °F (36.6 °C)  Heart Rate:  [55-64] 56  Resp:  [16] 16  BP: (107-124)/(58-71) 123/60    GENERAL: Awake and alert, NAD, very nice, sitting up in chair  EYES: No scleral icterus  CV: Normal rate  LUNGS: Normal work of breathing.  SKIN: Left knee bandaged  Vascular: RUE PIV without erythema  PSYCHIATRIC: Calm and pleasant    Results Review:  All labs below reviewed today    Lab Results   Component Value Date    WBC 6.61 02/09/2025    HGB 11.1 (L) 02/09/2025    HCT 34.9 02/09/2025    MCV 89.3 02/09/2025     02/09/2025     Lab Results   Component Value Date    GLUCOSE 93 02/10/2025    CALCIUM 8.7 02/10/2025     02/10/2025    K 4.6 02/10/2025    CO2 27.6 02/10/2025     02/10/2025    BUN 16 02/10/2025    CREATININE 0.81 02/10/2025    EGFR 84.8 02/10/2025    BCR 19.8 02/10/2025    ANIONGAP 6.4 02/10/2025     Lab Results   Component Value Date    CRP 4.20 (H) 02/08/2025     Lab Results   Component Value Date    HGBA1C 4.80 02/06/2025       Lab Results   Component Value Date    [facility]        Isolation:   No active isolations      Microbiology:  Microbiology Results (last 10 days)       Procedure Component Value - Date/Time    Wound Culture - Surgical Site, Knee, Left [[id number]] Collected: 02/07/25 0744    Lab Status: Preliminary result Specimen: Surgical Site from Knee, Left Updated: 02/10/25 1401     Wound Culture Culture in progress     Gram Stain No WBCs or organisms seen    Wound Culture - Surgical Site, Knee, Left [[id number]] Collected: 02/07/25 0744    Lab Status: Preliminary result Specimen: Surgical Site from Knee, Left Updated: 02/10/25 1401     Wound Culture Culture in progress     Gram Stain No WBCs or organisms seen    Wound Culture - Surgical Site, Knee, Left [[id number]] Collected: 02/07/25 0744    Lab Status: Preliminary result Specimen: Surgical Site from Knee, Left Updated: 02/10/25 1401     Wound Culture Culture in progress     Gram Stain No WBCs or organisms seen    Body Fluid Culture - Body Fluid, Knee, Left [[id number]] Collected: 02/04/25 1442    Lab Status: Final result Specimen: Body Fluid from Knee, Left Updated: 02/09/25 0711     Body Fluid Culture No growth at 5 days     Gram Stain No WBCs or organisms seen           2/4 left knee aspiration:  Component      Latest Ref Rng 2/4/2025   Color, Fluid Red    Appearance, Fluid      Clear  Cloudy !    RBC, Fluid      /mm3 100,000    Nucleated Cells, Fluid      /mm3 151,980    Method: Automated Sysmex XN Method    Neutrophils, Fluid      % 52    Lymphocytes, Fluid      % 1    Monocytes, Fluid      % 7    Mononuclear, Fluid      % 40       Legend:  ! Abnormal    Radiology:  XR left knee shows postoperative changes with hardware present    Left knee x-ray report reviewed with postoperative changes.    Assessment   #Left knee PJI s/p irrigation debridement with liner exchange on 2/7/2025  #Long-term use of antibiotics  #Elevated C-reactive protein  #History of left total knee replacement 6/5/2024    Case discussed with microbiology  today.  Similar organism isolated on all operative cultures however having difficulty with identification.  Sending a further test for hopeful identification tomorrow.     Continue empiric vancomycin IV with goal -600 and ceftriaxone 2 g IV every 24 hours while awaiting operative culture results.  Plan will be for 6-week course based on these results.  Possible PICC line tomorrow depending on susceptibilities.    Follow vancomycin level while on therapy for therapeutic drug monitoring.    ID will follow.

## 2025-02-10 NOTE — SIGNIFICANT NOTE
02/10/25 1302   OTHER   Discipline physical therapist   Rehab Time/Intention   Session Not Performed other (see comments)  (Per Nurse, pt now amb ad abdias - no PT needed today)

## 2025-02-10 NOTE — PLAN OF CARE
Goal Outcome Evaluation:  Plan of Care Reviewed With: patient        Progress: improving  Outcome Evaluation: Patient remains stable. VSS and voiding function intact. Bowel regimen held for loose stools. Patient ambulating ad abdias without issue. Pain managed with prn oxycodone and scheduled tylenol. IV abx regimen continued, final recommendations from ID expected tomorrow and for PICC to be ordered. HV remains in place. Plan for d/d home.

## 2025-02-10 NOTE — PLAN OF CARE
Goal Outcome Evaluation:  Plan of Care Reviewed With: patient        Progress: improving    A/Ox4. Up ad abdias w/ walker. No s/s distress. POD#3 L TKA polyexchange. W/ hemovac, drained 50ml this 12H shift. Pt continues on Rocephin and Vanc. Wound Cx in progress. Pain controlled w/ PRN Roxicodone. On scheduled Tylenol q4H. BRP. Last BM on 2/9, watery stools, per pt report. Needs attended.

## 2025-02-11 VITALS
SYSTOLIC BLOOD PRESSURE: 113 MMHG | HEIGHT: 64 IN | RESPIRATION RATE: 16 BRPM | DIASTOLIC BLOOD PRESSURE: 65 MMHG | BODY MASS INDEX: 23.71 KG/M2 | HEART RATE: 57 BPM | WEIGHT: 138.89 LBS | TEMPERATURE: 98.1 F | OXYGEN SATURATION: 98 %

## 2025-02-11 LAB
ANION GAP SERPL CALCULATED.3IONS-SCNC: 8 MMOL/L (ref 5–15)
BACTERIA SPEC AEROBE CULT: ABNORMAL
BUN SERPL-MCNC: 14 MG/DL (ref 6–20)
BUN/CREAT SERPL: 18.2 (ref 7–25)
CALCIUM SPEC-SCNC: 8.8 MG/DL (ref 8.6–10.5)
CHLORIDE SERPL-SCNC: 107 MMOL/L (ref 98–107)
CO2 SERPL-SCNC: 27 MMOL/L (ref 22–29)
CREAT SERPL-MCNC: 0.77 MG/DL (ref 0.57–1)
EGFRCR SERPLBLD CKD-EPI 2021: 90.1 ML/MIN/1.73
GLUCOSE SERPL-MCNC: 91 MG/DL (ref 65–99)
GRAM STN SPEC: ABNORMAL
POTASSIUM SERPL-SCNC: 4.3 MMOL/L (ref 3.5–5.2)
SODIUM SERPL-SCNC: 142 MMOL/L (ref 136–145)

## 2025-02-11 PROCEDURE — C1751 CATH, INF, PER/CENT/MIDLINE: HCPCS

## 2025-02-11 PROCEDURE — 99232 SBSQ HOSP IP/OBS MODERATE 35: CPT | Performed by: STUDENT IN AN ORGANIZED HEALTH CARE EDUCATION/TRAINING PROGRAM

## 2025-02-11 PROCEDURE — 80048 BASIC METABOLIC PNL TOTAL CA: CPT | Performed by: INTERNAL MEDICINE

## 2025-02-11 PROCEDURE — 25010000002 CEFTRIAXONE PER 250 MG: Performed by: STUDENT IN AN ORGANIZED HEALTH CARE EDUCATION/TRAINING PROGRAM

## 2025-02-11 PROCEDURE — 02HV33Z INSERTION OF INFUSION DEVICE INTO SUPERIOR VENA CAVA, PERCUTANEOUS APPROACH: ICD-10-PCS | Performed by: ORTHOPAEDIC SURGERY

## 2025-02-11 PROCEDURE — 97530 THERAPEUTIC ACTIVITIES: CPT

## 2025-02-11 RX ORDER — SODIUM CHLORIDE 0.9 % (FLUSH) 0.9 %
10 SYRINGE (ML) INJECTION AS NEEDED
Status: DISCONTINUED | OUTPATIENT
Start: 2025-02-11 | End: 2025-02-11 | Stop reason: HOSPADM

## 2025-02-11 RX ORDER — SODIUM CHLORIDE 0.9 % (FLUSH) 0.9 %
10 SYRINGE (ML) INJECTION EVERY 12 HOURS SCHEDULED
Status: DISCONTINUED | OUTPATIENT
Start: 2025-02-11 | End: 2025-02-11 | Stop reason: HOSPADM

## 2025-02-11 RX ORDER — SODIUM CHLORIDE 0.9 % (FLUSH) 0.9 %
20 SYRINGE (ML) INJECTION AS NEEDED
Status: DISCONTINUED | OUTPATIENT
Start: 2025-02-11 | End: 2025-02-11 | Stop reason: HOSPADM

## 2025-02-11 RX ORDER — SODIUM CHLORIDE 0.9 % (FLUSH) 0.9 %
20 SYRINGE (ML) INJECTION AS NEEDED
OUTPATIENT
Start: 2025-02-11

## 2025-02-11 RX ORDER — SODIUM CHLORIDE 9 MG/ML
40 INJECTION, SOLUTION INTRAVENOUS AS NEEDED
Status: DISCONTINUED | OUTPATIENT
Start: 2025-02-11 | End: 2025-02-11 | Stop reason: HOSPADM

## 2025-02-11 RX ORDER — SODIUM CHLORIDE 0.9 % (FLUSH) 0.9 %
10 SYRINGE (ML) INJECTION AS NEEDED
OUTPATIENT
Start: 2025-02-11

## 2025-02-11 RX ORDER — SODIUM CHLORIDE 0.9 % (FLUSH) 0.9 %
10 SYRINGE (ML) INJECTION EVERY 12 HOURS SCHEDULED
OUTPATIENT
Start: 2025-02-11

## 2025-02-11 RX ORDER — OXYCODONE AND ACETAMINOPHEN 5; 325 MG/1; MG/1
1 TABLET ORAL EVERY 4 HOURS PRN
Qty: 50 TABLET | Refills: 0 | Status: SHIPPED | OUTPATIENT
Start: 2025-02-11

## 2025-02-11 RX ORDER — ONDANSETRON 4 MG/1
4 TABLET, FILM COATED ORAL EVERY 6 HOURS PRN
Qty: 30 TABLET | Refills: 0 | Status: SHIPPED | OUTPATIENT
Start: 2025-02-11

## 2025-02-11 RX ORDER — ASPIRIN 81 MG/1
81 TABLET ORAL EVERY 12 HOURS
Qty: 60 TABLET | Refills: 0 | Status: SHIPPED | OUTPATIENT
Start: 2025-02-11 | End: 2025-03-13

## 2025-02-11 RX ADMIN — CEFTRIAXONE 2000 MG: 2 INJECTION, POWDER, FOR SOLUTION INTRAMUSCULAR; INTRAVENOUS at 17:12

## 2025-02-11 RX ADMIN — OXYCODONE HYDROCHLORIDE 10 MG: 5 TABLET ORAL at 01:27

## 2025-02-11 RX ADMIN — ACETAMINOPHEN 650 MG: 325 TABLET, FILM COATED ORAL at 05:21

## 2025-02-11 RX ADMIN — ACETAMINOPHEN 650 MG: 325 TABLET, FILM COATED ORAL at 17:12

## 2025-02-11 RX ADMIN — ACETAMINOPHEN 650 MG: 325 TABLET, FILM COATED ORAL at 08:40

## 2025-02-11 RX ADMIN — ASPIRIN 81 MG: 81 TABLET, DELAYED RELEASE ORAL at 08:40

## 2025-02-11 RX ADMIN — Medication 10 ML: at 08:42

## 2025-02-11 RX ADMIN — OXYCODONE HYDROCHLORIDE 10 MG: 5 TABLET ORAL at 05:21

## 2025-02-11 RX ADMIN — OXYCODONE HYDROCHLORIDE 10 MG: 5 TABLET ORAL at 18:00

## 2025-02-11 RX ADMIN — ACETAMINOPHEN 650 MG: 325 TABLET, FILM COATED ORAL at 12:56

## 2025-02-11 RX ADMIN — FAMOTIDINE 40 MG: 20 TABLET, FILM COATED ORAL at 08:40

## 2025-02-11 RX ADMIN — OXYCODONE HYDROCHLORIDE 10 MG: 5 TABLET ORAL at 12:56

## 2025-02-11 NOTE — CASE MANAGEMENT/SOCIAL WORK
Continued Stay Note  [facility]     Patient Name: Ila FRANCO  MRN: [id number]  Today's Date: 2/11/2025    Admit Date: 2/6/2025    Plan: Home with [facility]   Discharge Plan       Row Name 02/11/25 1314       Plan    Plan Home with [facility]    Patient/Family in Agreement with Plan yes    Plan Comments Home with [facility] for IV abx. PICC line being placed before discharge. [facility] will be by this afternoon to teach the patient. They will deliver the antibiotics to the patients home in the morning. Patient is going to be set up with their Infusion [facility] so she can go for her labs and PICC line care. CCP following.                   Discharge Codes    No documentation.                 Expected Discharge Date and Time       Expected Discharge Date Expected Discharge Time    Feb 11, 2025

## 2025-02-11 NOTE — DISCHARGE INSTRUCTIONS
Total Knee  Discharge Instructions  Dr. JONATHAN Mckeon” Kelsy II  [phone]    INCISION CARE  Wash your hands prior to dressing changes  CAYLA Wound VAC: Postoperatively you had a CAYLA Wound Vac placed on the incision. This was placed under sterile conditions in the operating room. It remains in place for 7 days postoperatively. After 7 days, the entire dressing must be removed, including all of the sticky adhesive. The dressing and battery pack provide gentle suction to the incision and provide several benefits over a traditional dressing:  It maintains the sterile environment of the OR and reduces the risk of infection  The suction removes unwanted buildup of blood/hematoma under the skin to reduce swelling  The suction also promotes fresh blood supply to the skin and soft tissue to speed up healing  The postoperative scar is reduced in size  Showering is permitted immediately after surgery, but the battery pack must be protected or removed during the shower.   After 7 days the CAYLA Wound Vac is removed. If there is no drainage, no dressing is required. If there is some scant drainage a dry bandage can be applied and changed daily until seen in the office or until the drainage stops.   No creams or ointments to the incisions until 4 weeks post op.  Do not touch or pick at the incision  Check incision every day and notify surgeon immediately if any of the following signs or symptoms are seen:  Increase in redness  Increase in swelling around the incision and of the entire extremity  Increase in pain  NEW drainage or oozing from the incision  Pulling apart of the edges of the incision  Increase in overall body temperature (greater than 100.4 degrees)  Zip-Line: your incision was closed with a state of the art device.   Is a non-invasive and easy to use wound closure device that replaces sutures, staples and glue for surgical incisions  It minimizes scarring and eliminating “railroad” marks that come with staples or  sutures  It makes removal as atraumatic as peeling off a bandage  Can be removed at home or by a physical therapist or nurse at 14 days postoperatively    ACTIVITIES  Exercises:  Physical therapy will begin immediately while in the hospital. Patients going to a nursing home will get therapy as part of their care at the SNU/SNF facility. Patients going home may also have a therapist come to the house to help them mobilize until they can safely get to an outpatient therapy facility.  Elevate the affected leg most of the day during the first week post operatively. Caution must be taken to avoid pillow placement directly under the heel of the leg, as this can cause pressure ulcers even with a soft pillow. All pillows and blankets should be placed underneath of the thigh and calf so that the heel is free-floating.  Use cold packs for 20-30 minutes approximately 5 times per day.  You should perform the daily stretching and strengthening exercises as taught by the therapist as often as possible. This can be done many times a day.  Full weight bearing is allowed after surgery. It will be sore/painful to put weight on the leg, but this will help the bone to heal and prevent complications such as pneumonia, bed sores and blood clots. Mobilization is vital to the recovery process.  Activities of Daily Living:  No tub baths, hot tubs, or swimming pools for 4 weeks.  May shower and let water run over the incision immediately after surgery. The battery pack of the CAYLA Wound Vac must be protected or removed while in the shower. After the CAYLA is removed 7 days after surgery showering is permitted as long as there is no drainage from the wound.     Restrictions  Weight: It is ok to allow full weight bearing after surgery. Weight on the leg actually quickens the recovery process. While it will be sore/painful to put weight on the leg, it is safe to do so. Hip replacement after hip fracture has a much slower recover process. It can  take months to heal fully from a hip fracture and patients even make some slow benefits up to a year afterwards.   Driving: Many patients have questions about when it is safe to return to driving. The answer is that this is extremely variable. It depends on the extent of the surgery, as well as how quickly you heal. Certainly left leg surgeries make returning to driving easier while right leg surgeries require more extensive rehabilitation before driving can be safe. Until you can press down on the brake hard, and are off of all narcotics, driving is not permitted. Your surgeon cannot “clear” you to return to driving, only you can make the decision when you feel it is safe.    Medications  Anticoagulants: After upper extremity surgery most patients do not require an anticoagulant unless you have another injury that will be keeping you from mobilizing. Lower extremity surgery typically does require use of an anticoagulation medicine.   IF YOU HAD LOWER EXTREMITY SURGERY AND ARE NOT DISCHARGED HOME WITH ANY ANTICOAGULANT MEDICINE YOU SHOULD TAKE ASPIRIN 325mg DAILY FOR 30 DAYS POSTOPERATIVELY.  If you are discharged home with an anticoagulant such as Aspirin, Xarelto, Eliquis, Coumadin, or Lovenox, follow these simple instructions:   Notify surgeon immediately if any reginaldo bleeding is noted in the urine, stool, emesis, or from the nose or the incision. Blood in the stool will often appear as black rather than red. Blood in urine may appear as pink. Blood in emesis may appear as brown/black like coffee grounds.  You will need to apply pressure for longer periods of time to any cuts or abrasions to stop bleeding  Avoid alcohol while taking anticoagulants  Most anticoagulants are to be taken for 30 days postoperatively. After this time, you may stop using them unless instructed otherwise.   If you were already taking an anticoagulant (commonly Aspirin, Coumadin, or Plavix) you will likely be resuming your normal dose  postoperatively and will be continuing that medication at the discretion of the prescribing physician.  Stool Softeners: You will be at greater risk of constipation after surgery due to being less mobile and the pain medications.  Take stool softeners as needed. Over the counter Colace 100 mg 1-2 capsules twice daily can be taken.  If stools become too loose or too frequent, please decreases the dosage or stop the stool softener.  If constipation occurs despite use of stool softeners, you are to continue the stool softeners and add a laxative (Milk of Magnesia 1 ounce daily as needed)  Drink plenty of fluids, and eat fruits and vegetables during your recovery time. Getting up and mobilizing will help the bowels to recover their regular function, as will weaning off of all narcotics when the pain becomes tolerable.  Pain Medications: Utilized after surgery are narcotics. This is some general information about these medications.  CLASSIFICATION: Pain medications are called Opioids and are narcotics  LEGALITIES: It is illegal to share narcotics with others  DRIVING: it is illegal to drive while under the influence of narcotics. Doing so is a DUI.  POTENTIAL SIDE EFFECTS: nausea, vomiting, itching, dizziness, drowsiness, dry mouth, constipation, and difficulty urinating.  POTENTIAL ADVERSE EFFECTS:  Opioid tolerance can develop with use of pain medications and this simply means that it requires more and more of the medication to control pain. However, this is seen more in patients that use opioids for longer periods of time.  Opioid dependence can develop with use of Opioids. People with opioid dependence will experience withdrawal symptoms upon cessation of the medication.  Opioid addiction can develop with use of Opioids. The incidence of this is very unlikely in patients who take the medications as ordered and stop the medications as instructed.  Opioid overdose can be dangerous, but is unlikely when the medication  is taken as ordered and stopped when ordered. It is important not to mix opioids with alcohol as this can lead to over sedation and respiratory difficulty.  DOSAGE:  After the initial surgical pain begins to resolve, you may begin to decrease the pain medication. By the end of a few weeks, you should be off of pain medications.  Refills will not be given by the office during evening hours, on weekends, or after 6 weeks post-op. You are responsible for weaning off of pain medication. You can increase the time between narcotic pills, taking one every 4 then 6 then 8 hours and so on.  To seek refills on pain medications during the initial 6-week post-operative period, you must call the office to request the refill. The office will then notify you when to  the prescription. DO NOT wait until you are out of the medication to request a refill. Prescriptions will not be filled over the weekend and depending on the schedule, it may take a couple days for the prescription to be available. Someone will have to pick the prescription in person at the office.    FOLLOW-UP VISITS  You will need to follow up in the office with your surgeon in 3 weeks, or as instructed elsewhere in your discharge paperwork. Please call this number [phone] to schedule this appointment. If you are going to an SNF/SNU facility, they will arrange for you to follow up in the office.  If you have any concerns or suspected complications prior to your follow up visit, please call the office. Do not wait until your appointment time if you suspect complications. These will need to be addressed in the office promptly.      Jorden Tierney II, MD  Orthopaedic Surgery  [facility]

## 2025-02-11 NOTE — PLAN OF CARE
Goal Outcome Evaluation:  Plan of Care Reviewed With: patient        Progress: improving  Outcome Evaluation: POD#4 OF Left Knee Poly Exchange.  Acewrap and HV in place. VSS. PO pain medication helping with pain. Steady gait. Up at abdias. Voiding fine per brp. Education provided on pain control and safety. Patient verbalized understanding.

## 2025-02-11 NOTE — SIGNIFICANT NOTE
"   02/11/25 1710   PICC Single Lumen 02/11/25 Left Basilic   Placement date: If unknown, DO NOT use \"Add Comment\" note/Placement time: If unknown, DO NOT use \"Add Comment\" note: 02/11/25 1700   Hand Hygiene Completed: Yes  Size (Fr): 4  Description (optional): single lumen PICC  Length (cm): 41 cm  Orientation:...   Site Assessment Clean;Dry;Intact   #1 Lumen Status Blood return noted;Capped;Flushed;Normal saline locked   Length caty (cm) 0 cm   Line Care Connections checked and tightened   Extremity Circumference (cm) 28 cm   Dressing Type Border Dressing;Transparent;Securing device;Antimicrobial dressing/disc   Dressing Status Clean;Dry;Intact   Dressing Intervention New dressing   Dressing Change Due 02/18/25   Indication/Daily Review of Necessity long-term IV access >7 days       PICC placed successfully, confirmation via 3CG  Primary RN notified - PICC LINE IS READY TO BE USED     LOT - [id number]  EX - 11/30/2025    FINAL COUNT - 3 NEEDLES, 2 GUIDEWIRES, 1 SCALPEL ACCOUNTED FOR AT PROCEDURE END       "

## 2025-02-11 NOTE — PROGRESS NOTES
ID progress note     CC: Follow-up left knee PJI    Subjective: Patient doing well this morning.  States she is feeling much better.  Tolerating antibiotics.  Family at bedside.    Medications:    Current Facility-Administered Medications:     acetaminophen (TYLENOL) tablet 650 mg, 650 mg, Oral, Q4H, Blanche Hall APRN, 650 mg at 02/11/25 0840    aspirin EC tablet 81 mg, 81 mg, Oral, BID, Mamadou Lucero Jr., MD, 81 mg at 02/11/25 0840    sennosides-docusate (PERICOLACE) 8.6-50 MG per tablet 2 tablet, 2 tablet, Oral, BID, 2 tablet at 02/08/25 0842 **AND** polyethylene glycol (MIRALAX) packet 17 g, 17 g, Oral, Daily PRN **AND** bisacodyl (DULCOLAX) EC tablet 5 mg, 5 mg, Oral, Daily PRN **AND** bisacodyl (DULCOLAX) suppository 10 mg, 10 mg, Rectal, Daily PRN, Jordne Tierney II, MD    cefTRIAXone (ROCEPHIN) 2,000 mg in sodium chloride 0.9 % 100 mL MBP, 2,000 mg, Intravenous, Q24H, Jorge Sparks, DO, Last Rate: 200 mL/hr at 02/10/25 1210, 2,000 mg at 02/10/25 1210    famotidine (PEPCID) tablet 40 mg, 40 mg, Oral, Daily, Jorden Tierney II, MD, 40 mg at 02/11/25 0840    HYDROmorphone (DILAUDID) injection 0.5 mg, 0.5 mg, Intravenous, Q4H PRN, Blanche Hall APRN    [DISCONTINUED] HYDROmorphone (DILAUDID) injection 1 mg, 1 mg, Intramuscular, Q2H PRN **AND** naloxone (NARCAN) injection 0.4 mg, 0.4 mg, Intravenous, Q5 Min PRN, Jorden Tierney II, MD    ondansetron ODT (ZOFRAN-ODT) disintegrating tablet 4 mg, 4 mg, Oral, Q6H PRN, Jorden Tierney II, MD    oxyCODONE (ROXICODONE) immediate release tablet 10 mg, 10 mg, Oral, Q4H PRN, Jorden Tierney II, MD, 10 mg at 02/11/25 0521    oxyCODONE (ROXICODONE) immediate release tablet 5 mg, 5 mg, Oral, Q4H PRN, Jorden Tierney II, MD, 5 mg at 02/08/25 2041    sodium chloride 0.9 % flush 1-10 mL, 1-10 mL, Intravenous, PRN, Jorden Tierney II, MD    sodium chloride 0.9 % flush 10 mL, 10 mL, Intravenous,  Q12H, Jorden Tierney II, MD, 10 mL at 02/11/25 0842    sodium chloride 0.9 % infusion 40 mL, 40 mL, Intravenous, PRN, Jorden Tierney II, MD      Objective     Physical Exam:   Vital Signs   Temp:  [97.8 °F (36.6 °C)-98.1 °F (36.7 °C)] 98.1 °F (36.7 °C)  Heart Rate:  [55-59] 57  Resp:  [16-17] 17  BP: (103-124)/(60-76) 103/71    GENERAL: Awake and alert, NAD  EYES: No scleral icterus  LUNGS: Normal work of breathing.  SKIN: Left knee with dressing in place  PSYCHIATRIC: Calm and pleasant    Results Review:  All labs below reviewed today    Lab Results   Component Value Date    WBC 6.61 02/09/2025    HGB 11.1 (L) 02/09/2025    HCT 34.9 02/09/2025    MCV 89.3 02/09/2025     02/09/2025     Lab Results   Component Value Date    GLUCOSE 91 02/11/2025    CALCIUM 8.8 02/11/2025     02/11/2025    K 4.3 02/11/2025    CO2 27.0 02/11/2025     02/11/2025    BUN 14 02/11/2025    CREATININE 0.77 02/11/2025    EGFR 90.1 02/11/2025    BCR 18.2 02/11/2025    ANIONGAP 8.0 02/11/2025     Lab Results   Component Value Date    CRP 4.20 (H) 02/08/2025     Lab Results   Component Value Date    HGBA1C 4.80 02/06/2025       Lab Results   Component Value Date    VANCOTROUGH 12.30 02/09/2025       Isolation:   No active isolations      Microbiology:  Microbiology Results (last 10 days)       Procedure Component Value - Date/Time    Wound Culture - Surgical Site, Knee, Left [[id number]]  (Abnormal) Collected: 02/07/25 0744    Lab Status: Final result Specimen: Surgical Site from Knee, Left Updated: 02/11/25 0731     Wound Culture Rare growth Granulicatella adiacens     Comment:   Granulicatella adiacens and Abiotrophia defectiva can be treated using vancomycin. If there is concern for deep nidus of infection, please consider infectious diseases consultation to guide therapy.        Gram Stain No WBCs or organisms seen    Wound Culture - Surgical Site, Knee, Left [[id number]]  (Abnormal) Collected: 02/07/25  0744    Lab Status: Final result Specimen: Surgical Site from Knee, Left Updated: 02/11/25 0732     Wound Culture Rare growth Granulicatella adiacens     Comment:   Granulicatella adiacens and Abiotrophia defectiva can be treated using vancomycin. If there is concern for deep nidus of infection, please consider infectious diseases consultation to guide therapy.        Gram Stain No WBCs or organisms seen    Wound Culture - Surgical Site, Knee, Left [[id number]]  (Abnormal) Collected: 02/07/25 0744    Lab Status: Final result Specimen: Surgical Site from Knee, Left Updated: 02/11/25 0734     Wound Culture Rare growth Granulicatella adiacens     Comment:   Granulicatella adiacens and Abiotrophia defectiva can be treated using vancomycin. If there is concern for deep nidus of infection, please consider infectious diseases consultation to guide therapy.        Gram Stain No WBCs or organisms seen    Body Fluid Culture - Body Fluid, Knee, Left [[id number]] Collected: 02/04/25 1442    Lab Status: Final result Specimen: Body Fluid from Knee, Left Updated: 02/09/25 0711     Body Fluid Culture No growth at 5 days     Gram Stain No WBCs or organisms seen           2/4 left knee aspiration:  Component      Latest Ref Rng 2/4/2025   Color, Fluid Red    Appearance, Fluid      Clear  Cloudy !    RBC, Fluid      /mm3 100,000    Nucleated Cells, Fluid      /mm3 151,980    Method: Automated Sysmex XN Method    Neutrophils, Fluid      % 52    Lymphocytes, Fluid      % 1    Monocytes, Fluid      % 7    Mononuclear, Fluid      % 40       Legend:  ! Abnormal    Radiology:  XR left knee shows postoperative changes with hardware present    Left knee x-ray report reviewed with postoperative changes.    Assessment   #Left knee PJI s/p irrigation debridement with liner exchange on 2/7/2025  #Long-term use of antibiotics  #Elevated C-reactive protein  #History of left total knee replacement 6/5/2024    Cultures now returned with Granulkandicella.  Continue ceftriaxone 2 g IV every 24 hours and stop vancomycin.  Plan will be for 6-week course through end date 3/20.  PICC line ordered today.  Patient will need weekly CBC with differential, creatinine and CRP on therapy faxed to [phone].  Follow-up will be scheduled me in clinic.      Thank you for allowing me to be involved in the care of this patient. Infectious diseases will sign off at this time with antibiotics plan in place, but please call me at [phone] if any further ID questions or new ID concerns.

## 2025-02-11 NOTE — PLAN OF CARE
Goal Outcome Evaluation:  Plan of Care Reviewed With: patient        Progress: improving  Outcome Evaluation: Patient seen this AM for PT and improved mobility. She completed all mobility ind and improved gait mechanics. Reviewed TKA protocol. No further acute PT needs. Plan home with HHPT.    Anticipated Discharge Disposition (PT): home with home health, home with assist

## 2025-02-11 NOTE — NURSING NOTE
IV team consulted for PICC line placement for discharge Ivabx thru 3/20.  Primary RN will contact IV team when consent, education, and behavorial contract has been signed.  IV team to follow

## 2025-02-11 NOTE — PLAN OF CARE
Goal Outcome Evaluation:  Plan of Care Reviewed With: patient        Progress: improving  Outcome Evaluation: Patient ambulating ad abdias without issue. VSS and voiding function intact. HV drain remains in place, per MD leave in place until d/c. IV abx continued. Awaiting final culture results before determining final abx regimen, see ID note. Patient anxious for d/c, hopefully tomorrow afternoon.

## 2025-02-11 NOTE — PROGRESS NOTES
Difficulty with isolating the bug in  lab.  Hopefully discharge home today once final antibiotic plan is established

## 2025-02-12 ENCOUNTER — READMISSION MANAGEMENT (OUTPATIENT)
Dept: CALL CENTER | Facility: HOSPITAL | Age: 60
End: 2025-02-12
Payer: COMMERCIAL

## 2025-02-12 NOTE — DISCHARGE SUMMARY
Orthopaedic Discharge Summary  Dr. JONATHAN Mckeon” Kelsy II  [phone]    NAME: Ila FRANCO PCP: Sadaf Dash MD   :  MRN: [id number]  [id number] LOS:  ADMIT: 1 days  2025   AGE/SEX: 59 y.o. 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    Discharge Medications:         Discharge Medications        New Medications        Instructions Start Date   aspirin 81 MG EC tablet   81 mg, Oral, Every 12 Hours      cefTRIAXone 2,000 mg in sodium chloride 0.9 % 100 mL IVPB   2,000 mg, Intravenous, Every 24 Hours      ondansetron 4 MG tablet  Commonly known as: Zofran   4 mg, Oral, Every 6 Hours PRN      oxyCODONE-acetaminophen 5-325 MG per tablet  Commonly known as: PERCOCET   1 tablet, Oral, Every 4 Hours PRN             Continue These Medications        Instructions Start Date   acetaminophen 500 MG tablet  Commonly known as: TYLENOL   500 mg, Every 6 Hours PRN      ibuprofen 800 MG tablet  Commonly known as: ADVIL,MOTRIN   1 tablet, 3 Times Daily               Vitals:     Vitals:    02/10/25 1731 02/10/25 2045 25 0532 25 0900   BP: 109/76 104/64 103/71 113/65   BP Location: Right arm Left arm Right arm Right arm   Patient Position: Sitting Lying Lying Lying   Pulse: 59 55 57 57   Resp: 16 16 17 16   Temp: 97.9 °F (36.6 °C) 97.9 °F (36.6 °C) 98.1 °F (36.7 °C) 98.1 °F (36.7 °C)   TempSrc: Oral Oral Oral Oral   SpO2: 98% 95% 95% 98%   Weight:       Height:           Labs:      Admission on 2025, Discharged on 2025   Component Date Value Ref Range Status    ABO Type 2025 AB   Final    RH type 2025 Positive   Final    Antibody Screen 2025 Negative   Final    T&S Expiration Date 2025 11:59:59 PM   Final    Glucose 2025 121 (H)  65 - 99 mg/dL Final    BUN 2025 10  6 - 20 mg/dL Final    Creatinine 2025 0.70  0.57 -  1.00 mg/dL Final    Sodium 02/06/2025 140  136 - 145 mmol/L Final    Potassium 02/06/2025 3.6  3.5 - 5.2 mmol/L Final    Chloride 02/06/2025 104  98 - 107 mmol/L Final    CO2 02/06/2025 27.0  22.0 - 29.0 mmol/L Final    Calcium 02/06/2025 9.1  8.6 - 10.5 mg/dL Final    BUN/Creatinine Ratio 02/06/2025 14.3  7.0 - 25.0 Final    Anion Gap 02/06/2025 9.0  5.0 - 15.0 mmol/L Final    eGFR 02/06/2025 101.0  >60.0 mL/min/1.73 Final    WBC 02/06/2025 4.67  3.40 - 10.80 10*3/mm3 Final    RBC 02/06/2025 3.88  3.77 - 5.28 10*6/mm3 Final    Hemoglobin 02/06/2025 11.5 (L)  12.0 - 15.9 g/dL Final    Hematocrit 02/06/2025 33.6 (L)  34.0 - 46.6 % Final    MCV 02/06/2025 86.6  79.0 - 97.0 fL Final    MCH 02/06/2025 29.6  26.6 - 33.0 pg Final    MCHC 02/06/2025 34.2  31.5 - 35.7 g/dL Final    RDW 02/06/2025 12.5  12.3 - 15.4 % Final    RDW-SD 02/06/2025 39.0  37.0 - 54.0 fl Final    MPV 02/06/2025 9.3  6.0 - 12.0 fL Final    Platelets 02/06/2025 238  140 - 450 10*3/mm3 Final    Neutrophil % 02/06/2025 66.4  42.7 - 76.0 % Final    Lymphocyte % 02/06/2025 18.4 (L)  19.6 - 45.3 % Final    Monocyte % 02/06/2025 11.8  5.0 - 12.0 % Final    Eosinophil % 02/06/2025 2.8  0.3 - 6.2 % Final    Basophil % 02/06/2025 0.4  0.0 - 1.5 % Final    Immature Grans % 02/06/2025 0.2  0.0 - 0.5 % Final    Neutrophils, Absolute 02/06/2025 3.10  1.70 - 7.00 10*3/mm3 Final    Lymphocytes, Absolute 02/06/2025 0.86  0.70 - 3.10 10*3/mm3 Final    Monocytes, Absolute 02/06/2025 0.55  0.10 - 0.90 10*3/mm3 Final    Eosinophils, Absolute 02/06/2025 0.13  0.00 - 0.40 10*3/mm3 Final    Basophils, Absolute 02/06/2025 0.02  0.00 - 0.20 10*3/mm3 Final    Immature Grans, Absolute 02/06/2025 0.01  0.00 - 0.05 10*3/mm3 Final    nRBC 02/06/2025 0.0  0.0 - 0.2 /100 WBC Final    Wound Culture 02/07/2025 Rare growth Granulicatella adiacens (A)   Final      Granulicatella adiacens and Abiotrophia defectiva can be treated using vancomycin. If there is concern for deep nidus of  infection, please consider infectious diseases consultation to guide therapy.    Gram Stain 02/07/2025 No WBCs or organisms seen   Final    Wound Culture 02/07/2025 Rare growth Granulicatella adiacens (A)   Final      Granulicatella adiacens and Abiotrophia defectiva can be treated using vancomycin. If there is concern for deep nidus of infection, please consider infectious diseases consultation to guide therapy.    Gram Stain 02/07/2025 No WBCs or organisms seen   Final    Wound Culture 02/07/2025 Rare growth Granulicatella adiacens (A)   Final      Granulicatella adiacens and Abiotrophia defectiva can be treated using vancomycin. If there is concern for deep nidus of infection, please consider infectious diseases consultation to guide therapy.    Gram Stain 02/07/2025 No WBCs or organisms seen   Final    Creatinine 02/08/2025 0.65  0.57 - 1.00 mg/dL Final    eGFR 02/08/2025 102.8  >60.0 mL/min/1.73 Final    C-Reactive Protein 02/08/2025 4.20 (H)  0.00 - 0.50 mg/dL Final    Glucose 02/09/2025 95  65 - 99 mg/dL Final    BUN 02/09/2025 18  6 - 20 mg/dL Final    Creatinine 02/09/2025 0.85  0.57 - 1.00 mg/dL Final    Sodium 02/09/2025 140  136 - 145 mmol/L Final    Potassium 02/09/2025 4.0  3.5 - 5.2 mmol/L Final    Chloride 02/09/2025 102  98 - 107 mmol/L Final    CO2 02/09/2025 27.4  22.0 - 29.0 mmol/L Final    Calcium 02/09/2025 9.2  8.6 - 10.5 mg/dL Final    BUN/Creatinine Ratio 02/09/2025 21.2  7.0 - 25.0 Final    Anion Gap 02/09/2025 10.6  5.0 - 15.0 mmol/L Final    eGFR 02/09/2025 80.0  >60.0 mL/min/1.73 Final    WBC 02/09/2025 6.61  3.40 - 10.80 10*3/mm3 Final    RBC 02/09/2025 3.91  3.77 - 5.28 10*6/mm3 Final    Hemoglobin 02/09/2025 11.1 (L)  12.0 - 15.9 g/dL Final    Hematocrit 02/09/2025 34.9  34.0 - 46.6 % Final    MCV 02/09/2025 89.3  79.0 - 97.0 fL Final    MCH 02/09/2025 28.4  26.6 - 33.0 pg Final    MCHC 02/09/2025 31.8  31.5 - 35.7 g/dL Final    RDW 02/09/2025 12.2 (L)  12.3 - 15.4 % Final    RDW-SD  02/09/2025 39.8  37.0 - 54.0 fl Final    MPV 02/09/2025 8.7  6.0 - 12.0 fL Final    Platelets 02/09/2025 272  140 - 450 10*3/mm3 Final    Vancomycin Trough 02/09/2025 12.30  5.00 - 20.00 mcg/mL Final    Glucose 02/10/2025 93  65 - 99 mg/dL Final    BUN 02/10/2025 16  6 - 20 mg/dL Final    Creatinine 02/10/2025 0.81  0.57 - 1.00 mg/dL Final    Sodium 02/10/2025 139  136 - 145 mmol/L Final    Potassium 02/10/2025 4.6  3.5 - 5.2 mmol/L Final    Chloride 02/10/2025 105  98 - 107 mmol/L Final    CO2 02/10/2025 27.6  22.0 - 29.0 mmol/L Final    Calcium 02/10/2025 8.7  8.6 - 10.5 mg/dL Final    BUN/Creatinine Ratio 02/10/2025 19.8  7.0 - 25.0 Final    Anion Gap 02/10/2025 6.4  5.0 - 15.0 mmol/L Final    eGFR 02/10/2025 84.8  >60.0 mL/min/1.73 Final    Glucose 02/11/2025 91  65 - 99 mg/dL Final    BUN 02/11/2025 14  6 - 20 mg/dL Final    Creatinine 02/11/2025 0.77  0.57 - 1.00 mg/dL Final    Sodium 02/11/2025 142  136 - 145 mmol/L Final    Potassium 02/11/2025 4.3  3.5 - 5.2 mmol/L Final    Chloride 02/11/2025 107  98 - 107 mmol/L Final    CO2 02/11/2025 27.0  22.0 - 29.0 mmol/L Final    Calcium 02/11/2025 8.8  8.6 - 10.5 mg/dL Final    BUN/Creatinine Ratio 02/11/2025 18.2  7.0 - 25.0 Final    Anion Gap 02/11/2025 8.0  5.0 - 15.0 mmol/L Final    eGFR 02/11/2025 90.1  >60.0 mL/min/1.73 Final        No results found.    Hospital Course:   59 y.o. 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 Post-operatively the patient transferred to the floor where the patient underwent mobilization therapy. Opioids were titrated to achieve appropriate pain management to allow for participation in mobilization exercises. Vital signs and laboratory values are now within safe parameters for discharge. The dressings and/or incision is intact without signs or symptoms of  "active infection. Operative extremity neurovascular status remains intact as compared to the preoperative exam. Appropriate education re: incision care, activity levels, medications, and follow up visits was completed and all questions were answered. The patient is now deemed stable for discharge.    HOME: The patient progressed well with physical therapy. There were cleared for discharge to home. The patietn was sent home in good condition}.       R \"Pankaj\" Kelsy ASHFORD MD  Orthopaedic Surgery  [facility]  [phone]                                               "

## 2025-02-12 NOTE — OUTREACH NOTE
Prep Survey      Flowsheet Row Responses   [facility]? [place]   Is LACE score < 7 ? No   Eligibility Readm Mgmt   Discharge diagnosis Infected prosthetic knee joint, left TK poly exchange   Does the patient have one of the following disease processes/diagnoses(primary or secondary)? General Surgery   Does the patient have Home health ordered? Yes   What is the Home health agency?  [facility]   Is there a DME ordered? Yes   What DME was ordered? [facility]--IV antibx, PICC line   Medication alerts for this patient see avs--IV antibx until 3/22/25   Prep survey completed? Yes            Yolanda BANG - Registered Nurse

## 2025-02-14 NOTE — PAYOR COMM NOTE
"Angie FRANCO (59 y.o. Female)    PLEASE SEE ATTACHED FOR DC NOTICE   REF#[id number]  THANK YOU  ERINN HICKMAN RN/UM DEPT   [facility]  PH: [phone]  FAX:  [phone]     Date of Birth   1965    Social Security Number       Address   [address] [place] [address]    Home Phone   [phone]    MRN   [id number]       Nondenominational   None    Marital Status                               Admission Date   2/6/25    Admission Type   Urgent    Admitting Provider   Jorden Tierney II, MD    Attending Provider       Department, Room/Bed   [facility] [address], P796/1       Discharge Date   2/11/2025    Discharge Disposition   Home or Self Care    Discharge Destination                                 Attending Provider: (none)   Allergies: Adhesive Tape, Penicillins    Isolation: None   Infection: None   Code Status: Not on file    Ht: 161.3 cm (63.5\")   Wt: 63 kg (138 lb 14.2 oz)    Admission Cmt: None   Principal Problem: 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,Z96.659]                   Active Insurance as of 2/6/2025       Primary Coverage       Payor Plan Insurance Group Employer/Plan Group    [facility] [id number]       Payor Plan Address Payor Plan Phone Number Payor Plan Fax Number Effective Dates    [address]   1/1/2018 - None Entered    [facility] [id number]         Subscriber Name Subscriber Birth Date Member ID       ANGIE FRANCO MINH 1965 [id number]                     Emergency Contacts        (Rel.) Home Phone Work Phone Mobile Phone    sergio wasserman (Spouse) -- -- [phone]    loni davis (Sister) -- -- [phone]              [employer]: NPI [id number]  Tax ID [id number]     Discharge Summary        Jorden Tierney II, MD at 02/11/25 1825            Orthopaedic Discharge Summary  Dr. CASTILLO “Pankaj” Kelsy ASHFORD  [phone]    NAME: Angie FRANCO PCP: Sadaf Dash, " MD   :  MRN: [id number]  [id number] LOS:  ADMIT: 1 days  2025   AGE/SEX: 59 y.o. 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    Discharge Medications:         Discharge Medications        New Medications        Instructions Start Date   aspirin 81 MG EC tablet   81 mg, Oral, Every 12 Hours      cefTRIAXone 2,000 mg in sodium chloride 0.9 % 100 mL IVPB   2,000 mg, Intravenous, Every 24 Hours      ondansetron 4 MG tablet  Commonly known as: Zofran   4 mg, Oral, Every 6 Hours PRN      oxyCODONE-acetaminophen 5-325 MG per tablet  Commonly known as: PERCOCET   1 tablet, Oral, Every 4 Hours PRN             Continue These Medications        Instructions Start Date   acetaminophen 500 MG tablet  Commonly known as: TYLENOL   500 mg, Every 6 Hours PRN      ibuprofen 800 MG tablet  Commonly known as: ADVIL,MOTRIN   1 tablet, 3 Times Daily               Vitals:     Vitals:    02/10/25 1731 02/10/25 2045 25 0532 25 0900   BP: 109/76 104/64 103/71 113/65   BP Location: Right arm Left arm Right arm Right arm   Patient Position: Sitting Lying Lying Lying   Pulse: 59 55 57 57   Resp: 16 16 17 16   Temp: 97.9 °F (36.6 °C) 97.9 °F (36.6 °C) 98.1 °F (36.7 °C) 98.1 °F (36.7 °C)   TempSrc: Oral Oral Oral Oral   SpO2: 98% 95% 95% 98%   Weight:       Height:           Labs:      Admission on 2025, Discharged on 2025   Component Date Value Ref Range Status    ABO Type 2025 AB   Final    RH type 2025 Positive   Final    Antibody Screen 2025 Negative   Final    T&S Expiration Date 2025 11:59:59 PM   Final    Glucose 2025 121 (H)  65 - 99 mg/dL Final    BUN 2025 10  6 - 20 mg/dL Final    Creatinine 2025 0.70  0.57 - 1.00 mg/dL Final    Sodium 2025 140  136 - 145 mmol/L Final    Potassium 2025 3.6  3.5 - 5.2 mmol/L Final     Chloride 02/06/2025 104  98 - 107 mmol/L Final    CO2 02/06/2025 27.0  22.0 - 29.0 mmol/L Final    Calcium 02/06/2025 9.1  8.6 - 10.5 mg/dL Final    BUN/Creatinine Ratio 02/06/2025 14.3  7.0 - 25.0 Final    Anion Gap 02/06/2025 9.0  5.0 - 15.0 mmol/L Final    eGFR 02/06/2025 101.0  >60.0 mL/min/1.73 Final    WBC 02/06/2025 4.67  3.40 - 10.80 10*3/mm3 Final    RBC 02/06/2025 3.88  3.77 - 5.28 10*6/mm3 Final    Hemoglobin 02/06/2025 11.5 (L)  12.0 - 15.9 g/dL Final    Hematocrit 02/06/2025 33.6 (L)  34.0 - 46.6 % Final    MCV 02/06/2025 86.6  79.0 - 97.0 fL Final    MCH 02/06/2025 29.6  26.6 - 33.0 pg Final    MCHC 02/06/2025 34.2  31.5 - 35.7 g/dL Final    RDW 02/06/2025 12.5  12.3 - 15.4 % Final    RDW-SD 02/06/2025 39.0  37.0 - 54.0 fl Final    MPV 02/06/2025 9.3  6.0 - 12.0 fL Final    Platelets 02/06/2025 238  140 - 450 10*3/mm3 Final    Neutrophil % 02/06/2025 66.4  42.7 - 76.0 % Final    Lymphocyte % 02/06/2025 18.4 (L)  19.6 - 45.3 % Final    Monocyte % 02/06/2025 11.8  5.0 - 12.0 % Final    Eosinophil % 02/06/2025 2.8  0.3 - 6.2 % Final    Basophil % 02/06/2025 0.4  0.0 - 1.5 % Final    Immature Grans % 02/06/2025 0.2  0.0 - 0.5 % Final    Neutrophils, Absolute 02/06/2025 3.10  1.70 - 7.00 10*3/mm3 Final    Lymphocytes, Absolute 02/06/2025 0.86  0.70 - 3.10 10*3/mm3 Final    Monocytes, Absolute 02/06/2025 0.55  0.10 - 0.90 10*3/mm3 Final    Eosinophils, Absolute 02/06/2025 0.13  0.00 - 0.40 10*3/mm3 Final    Basophils, Absolute 02/06/2025 0.02  0.00 - 0.20 10*3/mm3 Final    Immature Grans, Absolute 02/06/2025 0.01  0.00 - 0.05 10*3/mm3 Final    nRBC 02/06/2025 0.0  0.0 - 0.2 /100 WBC Final    Wound Culture 02/07/2025 Rare growth Granulicatella adiacens (A)   Final      Granulicatella adiacens and Abiotrophia defectiva can be treated using vancomycin. If there is concern for deep nidus of infection, please consider infectious diseases consultation to guide therapy.    Gram Stain 02/07/2025 No WBCs or organisms  seen   Final    Wound Culture 02/07/2025 Rare growth Granulicatella adiacens (A)   Final      Granulicatella adiacens and Abiotrophia defectiva can be treated using vancomycin. If there is concern for deep nidus of infection, please consider infectious diseases consultation to guide therapy.    Gram Stain 02/07/2025 No WBCs or organisms seen   Final    Wound Culture 02/07/2025 Rare growth Granulicatella adiacens (A)   Final      Granulicatella adiacens and Abiotrophia defectiva can be treated using vancomycin. If there is concern for deep nidus of infection, please consider infectious diseases consultation to guide therapy.    Gram Stain 02/07/2025 No WBCs or organisms seen   Final    Creatinine 02/08/2025 0.65  0.57 - 1.00 mg/dL Final    eGFR 02/08/2025 102.8  >60.0 mL/min/1.73 Final    C-Reactive Protein 02/08/2025 4.20 (H)  0.00 - 0.50 mg/dL Final    Glucose 02/09/2025 95  65 - 99 mg/dL Final    BUN 02/09/2025 18  6 - 20 mg/dL Final    Creatinine 02/09/2025 0.85  0.57 - 1.00 mg/dL Final    Sodium 02/09/2025 140  136 - 145 mmol/L Final    Potassium 02/09/2025 4.0  3.5 - 5.2 mmol/L Final    Chloride 02/09/2025 102  98 - 107 mmol/L Final    CO2 02/09/2025 27.4  22.0 - 29.0 mmol/L Final    Calcium 02/09/2025 9.2  8.6 - 10.5 mg/dL Final    BUN/Creatinine Ratio 02/09/2025 21.2  7.0 - 25.0 Final    Anion Gap 02/09/2025 10.6  5.0 - 15.0 mmol/L Final    eGFR 02/09/2025 80.0  >60.0 mL/min/1.73 Final    WBC 02/09/2025 6.61  3.40 - 10.80 10*3/mm3 Final    RBC 02/09/2025 3.91  3.77 - 5.28 10*6/mm3 Final    Hemoglobin 02/09/2025 11.1 (L)  12.0 - 15.9 g/dL Final    Hematocrit 02/09/2025 34.9  34.0 - 46.6 % Final    MCV 02/09/2025 89.3  79.0 - 97.0 fL Final    MCH 02/09/2025 28.4  26.6 - 33.0 pg Final    MCHC 02/09/2025 31.8  31.5 - 35.7 g/dL Final    RDW 02/09/2025 12.2 (L)  12.3 - 15.4 % Final    RDW-SD 02/09/2025 39.8  37.0 - 54.0 fl Final    MPV 02/09/2025 8.7  6.0 - 12.0 fL Final    Platelets 02/09/2025 272  140 - 450  10*3/mm3 Final    Vancomycin Trough 02/09/2025 12.30  5.00 - 20.00 mcg/mL Final    Glucose 02/10/2025 93  65 - 99 mg/dL Final    BUN 02/10/2025 16  6 - 20 mg/dL Final    Creatinine 02/10/2025 0.81  0.57 - 1.00 mg/dL Final    Sodium 02/10/2025 139  136 - 145 mmol/L Final    Potassium 02/10/2025 4.6  3.5 - 5.2 mmol/L Final    Chloride 02/10/2025 105  98 - 107 mmol/L Final    CO2 02/10/2025 27.6  22.0 - 29.0 mmol/L Final    Calcium 02/10/2025 8.7  8.6 - 10.5 mg/dL Final    BUN/Creatinine Ratio 02/10/2025 19.8  7.0 - 25.0 Final    Anion Gap 02/10/2025 6.4  5.0 - 15.0 mmol/L Final    eGFR 02/10/2025 84.8  >60.0 mL/min/1.73 Final    Glucose 02/11/2025 91  65 - 99 mg/dL Final    BUN 02/11/2025 14  6 - 20 mg/dL Final    Creatinine 02/11/2025 0.77  0.57 - 1.00 mg/dL Final    Sodium 02/11/2025 142  136 - 145 mmol/L Final    Potassium 02/11/2025 4.3  3.5 - 5.2 mmol/L Final    Chloride 02/11/2025 107  98 - 107 mmol/L Final    CO2 02/11/2025 27.0  22.0 - 29.0 mmol/L Final    Calcium 02/11/2025 8.8  8.6 - 10.5 mg/dL Final    BUN/Creatinine Ratio 02/11/2025 18.2  7.0 - 25.0 Final    Anion Gap 02/11/2025 8.0  5.0 - 15.0 mmol/L Final    eGFR 02/11/2025 90.1  >60.0 mL/min/1.73 Final        No results found.    Hospital Course:   59 y.o. 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 Post-operatively the patient transferred to the floor where the patient underwent mobilization therapy. Opioids were titrated to achieve appropriate pain management to allow for participation in mobilization exercises. Vital signs and laboratory values are now within safe parameters for discharge. The dressings and/or incision is intact without signs or symptoms of active infection. Operative extremity neurovascular status remains intact as compared to the preoperative exam. Appropriate  "education re: incision care, activity levels, medications, and follow up visits was completed and all questions were answered. The patient is now deemed stable for discharge.    HOME: The patient progressed well with physical therapy. There were cleared for discharge to home. The patietn was sent home in good condition}.       R \"Pankaj\" Kelsy ASHFORD MD  Orthopaedic Surgery  [facility]  [phone]                                                 Electronically signed by Jorden Tierney II, MD at 02/12/25 4802       "

## 2025-02-18 ENCOUNTER — READMISSION MANAGEMENT (OUTPATIENT)
Dept: CALL CENTER | Facility: HOSPITAL | Age: 60
End: 2025-02-18
Payer: COMMERCIAL

## 2025-02-18 NOTE — OUTREACH NOTE
General Surgery Week 1 Survey      Flowsheet Row Responses   [facility] patient discharged from? [place]   Does the patient have one of the following disease processes/diagnoses(primary or secondary)? General Surgery   Week 1 attempt successful? No   Call start time 1202   Unsuccessful attempts Attempt 1            Lissett Perez Registered Nurse

## 2025-02-18 NOTE — OUTREACH NOTE
General Surgery Week 1 Survey      Flowsheet Row Responses   [facility] patient discharged from? [place]   Does the patient have one of the following disease processes/diagnoses(primary or secondary)? General Surgery   Week 1 attempt successful? Yes   Call start time 1212   Unsuccessful attempts Attempt 1   Call end time 1213   Discharge diagnosis Infected prosthetic knee joint, left TK poly exchange   Meds reviewed with patient/caregiver? Yes   Is the patient having any side effects they believe may be caused by any medication additions or changes? No   Does the patient have all medications related to this admission filled (includes all antibiotics, pain medications, etc.) Yes   Is the patient taking all medications as directed (includes completed medication regime)? Yes   Does the patient have a follow up appointment scheduled with their surgeon? Yes   Has the patient kept scheduled appointments due by today? N/A   What is the Home health agency?  [facility]   Has home health visited the patient within 72 hours of discharge? Yes   Psychosocial issues? No   Did the patient receive a copy of their discharge instructions? Yes   Nursing interventions Reviewed instructions with patient   What is the patient's perception of their health status since discharge? Improving   Is the patient/caregiver able to teach back signs and symptoms of incisional infection? Increased redness, swelling or pain at the incisonal site, Increased drainage or bleeding, Incisional warmth, Pus or odor from incision, Fever   Is the patient/caregiver able to teach back steps to recovery at home? Set small, achievable goals for return to baseline health, Rest and rebuild strength, gradually increase activity, Eat a well-balance diet   Week 1 call completed? Yes   Graduated Yes   Wrap up additional comments Pt doing well and has f/u appts.   Call end time 1213            Lissett ONEILL - Registered Nurse

## 2025-02-25 ENCOUNTER — TELEPHONE (OUTPATIENT)
Dept: INFECTIOUS DISEASES | Facility: CLINIC | Age: 60
End: 2025-02-25
Payer: COMMERCIAL

## 2025-02-25 NOTE — TELEPHONE ENCOUNTER
"Patient calling to report side effects from ABX. She is currently on IV Ceftriaxone bassam 3/20/25 fro PJI left knee.     She reports the past couple of day she was getting really \"hot and tingling all over\" with her infusion. Yesterday, after she infused, she states she felt like she was itching from the inside-out.     She has NOT taken her infusion this morning. She is waiting to hear if Dr. Sparks wants her to change meds. Please advise. MILA RN  "

## 2025-03-20 ENCOUNTER — OFFICE VISIT (OUTPATIENT)
Dept: INFECTIOUS DISEASES | Facility: CLINIC | Age: 60
End: 2025-03-20
Payer: COMMERCIAL

## 2025-03-20 VITALS
DIASTOLIC BLOOD PRESSURE: 82 MMHG | TEMPERATURE: 97.1 F | HEART RATE: 83 BPM | RESPIRATION RATE: 12 BRPM | BODY MASS INDEX: 24.03 KG/M2 | WEIGHT: 137.8 LBS | SYSTOLIC BLOOD PRESSURE: 121 MMHG

## 2025-03-20 DIAGNOSIS — T84.59XD INFECTION OF PROSTHETIC KNEE JOINT, SUBSEQUENT ENCOUNTER: Primary | ICD-10-CM

## 2025-03-20 DIAGNOSIS — T84.7XXD HARDWARE COMPLICATING WOUND INFECTION, SUBSEQUENT ENCOUNTER: ICD-10-CM

## 2025-03-20 DIAGNOSIS — Z79.2 LONG TERM (CURRENT) USE OF ANTIBIOTICS: ICD-10-CM

## 2025-03-20 DIAGNOSIS — Z96.659 INFECTION OF PROSTHETIC KNEE JOINT, SUBSEQUENT ENCOUNTER: Primary | ICD-10-CM

## 2025-03-20 RX ORDER — VANCOMYCIN 750 MG/150ML
750 INJECTION, SOLUTION INTRAVENOUS ONCE
COMMUNITY
End: 2025-03-20

## 2025-03-20 RX ORDER — LEVOFLOXACIN 500 MG/1
500 TABLET, FILM COATED ORAL DAILY
Qty: 30 TABLET | Refills: 5 | Status: SHIPPED | OUTPATIENT
Start: 2025-03-20 | End: 2025-09-16

## 2025-03-20 NOTE — PROGRESS NOTES
"Chief Complaint  Follow-up    Subjective        Ila FRANCO presents to [facility] INFECTIOUS DISEASES  History of Present Illness    Patient is a 59 y.o. female with past medical history of left knee replacement subsequently developing left PJI of the knee requiring irrigation debridement with liner exchange on 2/7/2025.  Operative cultures grew Granulicatella.  Initially patient was placed on 6 weeks of ceftriaxone however patient did not tolerate this and was later switched to vancomycin through end date 3/20.  Here today for follow-up.    Patient reports she is doing well.  Denies any fevers or chills.  States she is having no pain at the knee.  States she did have some difficulty with stitches but this has improved.  Denies any difficulty with the PICC line.  Tolerating vancomycin much better.    Objective   Vital Signs:  /82   Pulse 83   Temp 97.1 °F (36.2 °C)   Resp 12   Wt 62.5 kg (137 lb 12.8 oz)   BMI 24.03 kg/m²   Estimated body mass index is 24.03 kg/m² as calculated from the following:    Height as of 2/6/25: 161.3 cm (63.5\").    Weight as of this encounter: 62.5 kg (137 lb 12.8 oz).    BMI is within normal parameters. No other follow-up for BMI required.      Physical Exam  Constitutional:       General: She is not in acute distress.     Appearance: Normal appearance. She is normal weight. She is not ill-appearing.   HENT:      Head: Normocephalic and atraumatic.      Nose: Nose normal. No rhinorrhea.      Mouth/Throat:      Mouth: Mucous membranes are moist.      Pharynx: No oropharyngeal exudate.   Eyes:      General: No scleral icterus.     Extraocular Movements: Extraocular movements intact.      Pupils: Pupils are equal, round, and reactive to light.   Cardiovascular:      Rate and Rhythm: Normal rate.      Pulses: Normal pulses.   Pulmonary:      Effort: Pulmonary effort is normal. No respiratory distress.   Abdominal:      General: Abdomen is flat.      " Palpations: Abdomen is soft.   Musculoskeletal:         General: No swelling or tenderness. Normal range of motion.      Cervical back: Normal range of motion and neck supple.      Right lower leg: No edema.      Left lower leg: No edema.   Skin:     General: Skin is warm and dry.      Findings: No rash.   Neurological:      General: No focal deficit present.      Mental Status: She is alert and oriented to person, place, and time.   Psychiatric:         Mood and Affect: Mood normal.         Behavior: Behavior normal.        Result Review :  The following data was reviewed by: Jorge Sparks DO on 03/20/2025:  Common labs          2/9/2025    10:57 2/10/2025    05:43 2/11/2025    04:51   Common Labs   Glucose 95  93  91    BUN 18  16  14    Creatinine 0.85  0.81  0.77    Sodium 140  139  142    Potassium 4.0  4.6  4.3    Chloride 102  105  107    Calcium 9.2  8.7  8.8    WBC 6.61      Hemoglobin 11.1      Hematocrit 34.9      Platelets 272        Data reviewed : Admission notes and microbiology .  Antibiotic monitoring labs.         Assessment and Plan   Diagnoses and all orders for this visit:    1. Infection of prosthetic knee joint, subsequent encounter (Primary)    2. Long term (current) use of antibiotics    3. Hardware complicating wound infection, subsequent encounter    Other orders  -     levoFLOXacin (LEVAQUIN) 500 MG tablet; Take 1 tablet by mouth Daily for 180 days.  Dispense: 30 tablet; Refill: 5    Patient is now completed 6 weeks of antimicrobial therapy with initially on ceftriaxone followed by vancomycin due to drug reaction.  Will consider IV therapy complete today and remove PICC line in the office.  We discussed her retained hardware and the possibility of recurrence.  We reviewed her isolated bacteria and the antibiotic options.  Will send in levofloxacin 500 mg daily to start for a 6-month consolidative course for prosthetic knee joint infection.  Counseled on medication side effects  including C. difficile, tendinopathy and arrhythmia.  EKG reviewed today.  QTc within normal limits.  Will discuss suppressive options at 6-month follow-up.       I spent 35 minutes caring for Ila on this date of service. This time includes time spent by me in the following activities:preparing for the visit, reviewing tests, obtaining and/or reviewing a separately obtained history, performing a medically appropriate examination and/or evaluation , counseling and educating the patient/family/caregiver, ordering medications, tests, or procedures, documenting information in the medical record, independently interpreting results and communicating that information with the patient/family/caregiver, and care coordination  Follow Up   No follow-ups on file.  Patient was given instructions and counseling regarding her condition or for health maintenance advice. Please see specific information pulled into the AVS if appropriate.

## 2025-04-15 ENCOUNTER — TELEPHONE (OUTPATIENT)
Dept: INFECTIOUS DISEASES | Facility: CLINIC | Age: 60
End: 2025-04-15
Payer: COMMERCIAL

## 2025-04-15 NOTE — TELEPHONE ENCOUNTER
Patient calling to report low back pain and pain in left leg while on Levaquin. States she was told to call if she noticed any pain in her joints while on this medication. Please advise if any new orders. MILA, RN

## 2025-04-16 ENCOUNTER — TELEPHONE (OUTPATIENT)
Dept: INFECTIOUS DISEASES | Facility: CLINIC | Age: 60
End: 2025-04-16
Payer: COMMERCIAL

## 2025-04-16 NOTE — TELEPHONE ENCOUNTER
Spoke with patient by phone. Informed her, per Dr. Sparks, that she is okay to continue with her Levaquin and should watch for any signs of tendon issues (achilles). Patient voiced understanding. MILA, RN

## 2025-07-11 ENCOUNTER — TELEPHONE (OUTPATIENT)
Dept: INFECTIOUS DISEASES | Facility: CLINIC | Age: 60
End: 2025-07-11
Payer: COMMERCIAL

## 2025-07-11 RX ORDER — AMOXICILLIN 500 MG/1
1000 CAPSULE ORAL ONCE
Qty: 2 CAPSULE | Refills: 0 | Status: SHIPPED | OUTPATIENT
Start: 2025-07-11 | End: 2025-07-11

## 2025-07-11 NOTE — TELEPHONE ENCOUNTER
----- Message from Nicole BANG sent at 7/11/2025  2:08 PM EDT -----  Patient scheduled for dental appointment on 8/1/25. Dentist will not prescribe recommended antibiotic and patient is requesting that rx from you if at all possible.

## 2025-08-29 ENCOUNTER — TRANSCRIBE ORDERS (OUTPATIENT)
Dept: LAB | Facility: HOSPITAL | Age: 60
End: 2025-08-29
Payer: COMMERCIAL

## 2025-08-29 ENCOUNTER — LAB (OUTPATIENT)
Dept: LAB | Facility: HOSPITAL | Age: 60
End: 2025-08-29
Payer: COMMERCIAL

## 2025-08-29 ENCOUNTER — OFFICE VISIT (OUTPATIENT)
Dept: INFECTIOUS DISEASES | Facility: CLINIC | Age: 60
End: 2025-08-29
Payer: COMMERCIAL

## 2025-08-29 VITALS
BODY MASS INDEX: 24.98 KG/M2 | SYSTOLIC BLOOD PRESSURE: 102 MMHG | TEMPERATURE: 97.3 F | DIASTOLIC BLOOD PRESSURE: 70 MMHG | HEART RATE: 69 BPM | RESPIRATION RATE: 16 BRPM | WEIGHT: 141 LBS | HEIGHT: 63 IN

## 2025-08-29 DIAGNOSIS — T84.7XXD HARDWARE COMPLICATING WOUND INFECTION, SUBSEQUENT ENCOUNTER: ICD-10-CM

## 2025-08-29 DIAGNOSIS — M25.562 LEFT KNEE PAIN, UNSPECIFIED CHRONICITY: ICD-10-CM

## 2025-08-29 DIAGNOSIS — T84.59XD INFECTION OF PROSTHETIC KNEE JOINT, SUBSEQUENT ENCOUNTER: ICD-10-CM

## 2025-08-29 DIAGNOSIS — Z79.2 LONG TERM (CURRENT) USE OF ANTIBIOTICS: ICD-10-CM

## 2025-08-29 DIAGNOSIS — Z96.659 INFECTION OF PROSTHETIC KNEE JOINT, SUBSEQUENT ENCOUNTER: ICD-10-CM

## 2025-08-29 DIAGNOSIS — T84.59XD INFECTION OF PROSTHETIC KNEE JOINT, SUBSEQUENT ENCOUNTER: Primary | ICD-10-CM

## 2025-08-29 DIAGNOSIS — Z96.659 INFECTION OF PROSTHETIC KNEE JOINT, SUBSEQUENT ENCOUNTER: Primary | ICD-10-CM

## 2025-08-29 DIAGNOSIS — M25.562 LEFT KNEE PAIN, UNSPECIFIED CHRONICITY: Primary | ICD-10-CM

## 2025-08-29 LAB
ANION GAP SERPL CALCULATED.3IONS-SCNC: 12.7 MMOL/L (ref 5–15)
APPEARANCE FLD: ABNORMAL
BASOPHILS # BLD AUTO: 0.03 10*3/MM3 (ref 0–0.2)
BASOPHILS NFR BLD AUTO: 0.7 % (ref 0–1.5)
BUN SERPL-MCNC: 15 MG/DL (ref 6–20)
BUN/CREAT SERPL: 14.6 (ref 7–25)
CALCIUM SPEC-SCNC: 10 MG/DL (ref 8.6–10.5)
CHLORIDE SERPL-SCNC: 105 MMOL/L (ref 98–107)
CO2 SERPL-SCNC: 26.3 MMOL/L (ref 22–29)
COLOR FLD: ABNORMAL
CREAT SERPL-MCNC: 1.03 MG/DL (ref 0.57–1)
CRP SERPL-MCNC: <0.3 MG/DL (ref 0–0.5)
DEPRECATED RDW RBC AUTO: 41.2 FL (ref 37–54)
EGFRCR SERPLBLD CKD-EPI 2021: 63.6 ML/MIN/1.73
EOSINOPHIL # BLD AUTO: 0.16 10*3/MM3 (ref 0–0.4)
EOSINOPHIL NFR BLD AUTO: 4 % (ref 0.3–6.2)
ERYTHROCYTE [DISTWIDTH] IN BLOOD BY AUTOMATED COUNT: 13 % (ref 12.3–15.4)
GLUCOSE SERPL-MCNC: 81 MG/DL (ref 65–99)
HCT VFR BLD AUTO: 38.6 % (ref 34–46.6)
HGB BLD-MCNC: 13 G/DL (ref 12–15.9)
IMM GRANULOCYTES # BLD AUTO: 0.01 10*3/MM3 (ref 0–0.05)
IMM GRANULOCYTES NFR BLD AUTO: 0.2 % (ref 0–0.5)
LYMPHOCYTES # BLD AUTO: 1.2 10*3/MM3 (ref 0.7–3.1)
LYMPHOCYTES NFR BLD AUTO: 29.6 % (ref 19.6–45.3)
LYMPHOCYTES NFR FLD MANUAL: 38 %
MCH RBC QN AUTO: 29.2 PG (ref 26.6–33)
MCHC RBC AUTO-ENTMCNC: 33.7 G/DL (ref 31.5–35.7)
MCV RBC AUTO: 86.7 FL (ref 79–97)
METHOD: ABNORMAL
MONOCYTES # BLD AUTO: 0.43 10*3/MM3 (ref 0.1–0.9)
MONOCYTES NFR BLD AUTO: 10.6 % (ref 5–12)
MONOCYTES NFR FLD: 10 %
NEUTROPHILS NFR BLD AUTO: 2.22 10*3/MM3 (ref 1.7–7)
NEUTROPHILS NFR BLD AUTO: 54.9 % (ref 42.7–76)
NEUTROPHILS NFR FLD MANUAL: 52 %
NRBC BLD AUTO-RTO: 0 /100 WBC (ref 0–0.2)
NUC CELL # FLD: 176 /MM3
PLATELET # BLD AUTO: 215 10*3/MM3 (ref 140–450)
PMV BLD AUTO: 9.6 FL (ref 6–12)
POTASSIUM SERPL-SCNC: 4.5 MMOL/L (ref 3.5–5.2)
RBC # BLD AUTO: 4.45 10*6/MM3 (ref 3.77–5.28)
RBC # FLD AUTO: ABNORMAL /MM3
SODIUM SERPL-SCNC: 144 MMOL/L (ref 136–145)
WBC NRBC COR # BLD AUTO: 4.05 10*3/MM3 (ref 3.4–10.8)

## 2025-08-29 PROCEDURE — 89051 BODY FLUID CELL COUNT: CPT

## 2025-08-29 PROCEDURE — 87075 CULTR BACTERIA EXCEPT BLOOD: CPT

## 2025-08-29 PROCEDURE — 87070 CULTURE OTHR SPECIMN AEROBIC: CPT

## 2025-08-29 PROCEDURE — 85025 COMPLETE CBC W/AUTO DIFF WBC: CPT

## 2025-08-29 PROCEDURE — 36415 COLL VENOUS BLD VENIPUNCTURE: CPT

## 2025-08-29 PROCEDURE — 80048 BASIC METABOLIC PNL TOTAL CA: CPT

## 2025-08-29 PROCEDURE — 87205 SMEAR GRAM STAIN: CPT

## 2025-08-29 PROCEDURE — 86140 C-REACTIVE PROTEIN: CPT

## (undated) DEVICE — SOL NACL 0.9PCT 1000ML

## (undated) DEVICE — PK KN TOTL 40

## (undated) DEVICE — PATIENT RETURN ELECTRODE, SINGLE-USE, CONTACT QUALITY MONITORING, ADULT, WITH 9FT CORD, FOR PATIENTS WEIGING OVER 33LBS. (15KG): Brand: MEGADYNE

## (undated) DEVICE — GLV SURG PREMIERPRO ORTHO LTX PF SZ8.5 BRN

## (undated) DEVICE — SUT ETHIB 2 CV V37 MS/4 30IN MX69G

## (undated) DEVICE — SOL ISO/ALC 70PCT 4OZ

## (undated) DEVICE — PREP SOL POVIDONE/IODINE BT 4OZ

## (undated) DEVICE — TRAP FLD MINIVAC MEGADYNE 100ML

## (undated) DEVICE — THE STERILE LIGHT HANDLE COVER IS USED WITH STERIS SURGICAL LIGHTING AND VISUALIZATION SYSTEMS.

## (undated) DEVICE — APPL CHLORAPREP HI/LITE 26ML ORNG

## (undated) DEVICE — GLV SURG SIGNATURE ESSENTIAL PF LTX SZ8.5

## (undated) DEVICE — NEEDLE, QUINCKE, 20GX3.5": Brand: MEDLINE

## (undated) DEVICE — KT DRN EVAC WND PVC 15F3/16IN 400CC